# Patient Record
Sex: MALE | Race: BLACK OR AFRICAN AMERICAN | NOT HISPANIC OR LATINO | Employment: FULL TIME | URBAN - METROPOLITAN AREA
[De-identification: names, ages, dates, MRNs, and addresses within clinical notes are randomized per-mention and may not be internally consistent; named-entity substitution may affect disease eponyms.]

---

## 2018-01-24 PROBLEM — R00.1 SLOW HEART BEAT: Status: ACTIVE | Noted: 2018-01-24

## 2018-01-24 PROBLEM — R94.31 ABNORMAL EKG: Status: ACTIVE | Noted: 2018-01-24

## 2018-01-24 PROBLEM — I10 HYPERTENSION: Status: ACTIVE | Noted: 2018-01-24

## 2018-01-25 PROBLEM — R00.2 PALPITATIONS: Status: ACTIVE | Noted: 2018-01-25

## 2018-06-11 ENCOUNTER — NURSE TRIAGE (OUTPATIENT)
Dept: ADMINISTRATIVE | Facility: CLINIC | Age: 38
End: 2018-06-11
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
## 2020-03-29 ENCOUNTER — HOSPITAL ENCOUNTER (INPATIENT)
Facility: HOSPITAL | Age: 40
LOS: 4 days | Discharge: HOME | DRG: 177 | End: 2020-04-02
Attending: EMERGENCY MEDICINE | Admitting: FAMILY MEDICINE
Payer: MEDICAID

## 2020-03-29 DIAGNOSIS — U07.1 COVID-19 VIRUS INFECTION: ICD-10-CM

## 2020-03-29 DIAGNOSIS — R06.02 SHORTNESS OF BREATH: ICD-10-CM

## 2020-03-29 DIAGNOSIS — E11.9 TYPE 2 DIABETES MELLITUS WITHOUT COMPLICATION, WITHOUT LONG-TERM CURRENT USE OF INSULIN: ICD-10-CM

## 2020-03-29 DIAGNOSIS — R09.02 HYPOXIA: ICD-10-CM

## 2020-03-29 DIAGNOSIS — J81.0 ACUTE PULMONARY EDEMA: ICD-10-CM

## 2020-03-29 DIAGNOSIS — J18.9 PNEUMONIA OF BOTH LOWER LOBES DUE TO INFECTIOUS ORGANISM: Primary | ICD-10-CM

## 2020-03-29 DIAGNOSIS — Z20.822 SUSPECTED COVID-19 VIRUS INFECTION: ICD-10-CM

## 2020-03-29 LAB
ALBUMIN SERPL BCP-MCNC: 4 G/DL (ref 3.5–5.2)
ALP SERPL-CCNC: 60 U/L (ref 38–126)
ALT SERPL W/O P-5'-P-CCNC: 114 U/L (ref 10–44)
ANION GAP SERPL CALC-SCNC: 9 MMOL/L (ref 8–16)
AST SERPL-CCNC: 162 U/L (ref 15–46)
BASOPHILS # BLD AUTO: 0.01 K/UL (ref 0–0.2)
BASOPHILS NFR BLD: 0.1 % (ref 0–1.9)
BILIRUB SERPL-MCNC: 0.6 MG/DL (ref 0.1–1)
BNP SERPL-MCNC: <10 PG/ML (ref 0–99)
BUN SERPL-MCNC: 13 MG/DL (ref 2–20)
CALCIUM SERPL-MCNC: 8.8 MG/DL (ref 8.7–10.5)
CHLORIDE SERPL-SCNC: 97 MMOL/L (ref 95–110)
CK MB SERPL-MCNC: 2.2 NG/ML (ref 0.1–6.5)
CK MB SERPL-RTO: 0.1 % (ref 0–5)
CK SERPL-CCNC: 3697 U/L (ref 20–200)
CK SERPL-CCNC: 3774 U/L (ref 20–200)
CO2 SERPL-SCNC: 24 MMOL/L (ref 23–29)
CREAT SERPL-MCNC: 0.85 MG/DL (ref 0.5–1.4)
CRP SERPL-MCNC: 114.05 MG/L (ref 0–3.19)
CRP SERPL-MCNC: 8.95 MG/DL (ref 0–1)
D DIMER PPP IA.FEU-MCNC: 0.54 MG/L FEU
DIFFERENTIAL METHOD: ABNORMAL
EOSINOPHIL # BLD AUTO: 0 K/UL (ref 0–0.5)
EOSINOPHIL NFR BLD: 0 % (ref 0–8)
ERYTHROCYTE [DISTWIDTH] IN BLOOD BY AUTOMATED COUNT: 13.8 % (ref 11.5–14.5)
EST. GFR  (AFRICAN AMERICAN): >60 ML/MIN/1.73 M^2
EST. GFR  (NON AFRICAN AMERICAN): >60 ML/MIN/1.73 M^2
FERRITIN SERPL-MCNC: 1162 NG/ML (ref 20–300)
GLUCOSE SERPL-MCNC: 141 MG/DL (ref 70–110)
HCT VFR BLD AUTO: 39.7 % (ref 40–54)
HGB BLD-MCNC: 12.7 G/DL (ref 14–18)
IMM GRANULOCYTES # BLD AUTO: 0.08 K/UL (ref 0–0.04)
IMM GRANULOCYTES NFR BLD AUTO: 0.9 % (ref 0–0.5)
INFLUENZA A, MOLECULAR: NEGATIVE
INFLUENZA B, MOLECULAR: NEGATIVE
LDH SERPL L TO P-CCNC: 700 U/L (ref 110–260)
LYMPHOCYTES # BLD AUTO: 0.9 K/UL (ref 1–4.8)
LYMPHOCYTES NFR BLD: 10.5 % (ref 18–48)
MCH RBC QN AUTO: 23.1 PG (ref 27–31)
MCHC RBC AUTO-ENTMCNC: 32 G/DL (ref 32–36)
MCV RBC AUTO: 72 FL (ref 82–98)
MONOCYTES # BLD AUTO: 0.4 K/UL (ref 0.3–1)
MONOCYTES NFR BLD: 4.2 % (ref 4–15)
NEUTROPHILS # BLD AUTO: 7.4 K/UL (ref 1.8–7.7)
NEUTROPHILS NFR BLD: 84.3 % (ref 38–73)
NRBC BLD-RTO: 0 /100 WBC
PLATELET # BLD AUTO: 227 K/UL (ref 150–350)
PMV BLD AUTO: 11.6 FL (ref 9.2–12.9)
POTASSIUM SERPL-SCNC: 4.1 MMOL/L (ref 3.5–5.1)
PROT SERPL-MCNC: 7.7 G/DL (ref 6–8.4)
RBC # BLD AUTO: 5.49 M/UL (ref 4.6–6.2)
SODIUM SERPL-SCNC: 130 MMOL/L (ref 136–145)
SPECIMEN SOURCE: NORMAL
TROPONIN I SERPL DL<=0.01 NG/ML-MCNC: 0.02 NG/ML (ref 0–0.03)
WBC # BLD AUTO: 8.75 K/UL (ref 3.9–12.7)

## 2020-03-29 PROCEDURE — 86140 C-REACTIVE PROTEIN: CPT | Mod: ER

## 2020-03-29 PROCEDURE — 82550 ASSAY OF CK (CPK): CPT | Mod: 91

## 2020-03-29 PROCEDURE — 99285 EMERGENCY DEPT VISIT HI MDM: CPT | Mod: 25,ER

## 2020-03-29 PROCEDURE — 87502 INFLUENZA DNA AMP PROBE: CPT | Mod: ER

## 2020-03-29 PROCEDURE — 93010 ELECTROCARDIOGRAM REPORT: CPT | Mod: ,,, | Performed by: INTERNAL MEDICINE

## 2020-03-29 PROCEDURE — 82553 CREATINE MB FRACTION: CPT

## 2020-03-29 PROCEDURE — G0378 HOSPITAL OBSERVATION PER HR: HCPCS

## 2020-03-29 PROCEDURE — 11000001 HC ACUTE MED/SURG PRIVATE ROOM

## 2020-03-29 PROCEDURE — 84484 ASSAY OF TROPONIN QUANT: CPT

## 2020-03-29 PROCEDURE — 36415 COLL VENOUS BLD VENIPUNCTURE: CPT

## 2020-03-29 PROCEDURE — 87040 BLOOD CULTURE FOR BACTERIA: CPT | Mod: ER

## 2020-03-29 PROCEDURE — 96365 THER/PROPH/DIAG IV INF INIT: CPT | Mod: ER

## 2020-03-29 PROCEDURE — 85025 COMPLETE CBC W/AUTO DIFF WBC: CPT | Mod: ER

## 2020-03-29 PROCEDURE — 96368 THER/DIAG CONCURRENT INF: CPT | Mod: ER

## 2020-03-29 PROCEDURE — 94760 N-INVAS EAR/PLS OXIMETRY 1: CPT | Mod: ER

## 2020-03-29 PROCEDURE — 82728 ASSAY OF FERRITIN: CPT

## 2020-03-29 PROCEDURE — U0002 COVID-19 LAB TEST NON-CDC: HCPCS | Mod: ER

## 2020-03-29 PROCEDURE — 63600175 PHARM REV CODE 636 W HCPCS: Mod: ER | Performed by: EMERGENCY MEDICINE

## 2020-03-29 PROCEDURE — 25000003 PHARM REV CODE 250: Performed by: STUDENT IN AN ORGANIZED HEALTH CARE EDUCATION/TRAINING PROGRAM

## 2020-03-29 PROCEDURE — 86141 C-REACTIVE PROTEIN HS: CPT

## 2020-03-29 PROCEDURE — 83880 ASSAY OF NATRIURETIC PEPTIDE: CPT

## 2020-03-29 PROCEDURE — 83615 LACTATE (LD) (LDH) ENZYME: CPT

## 2020-03-29 PROCEDURE — 93005 ELECTROCARDIOGRAM TRACING: CPT | Mod: ER

## 2020-03-29 PROCEDURE — 80053 COMPREHEN METABOLIC PANEL: CPT | Mod: ER

## 2020-03-29 PROCEDURE — 96372 THER/PROPH/DIAG INJ SC/IM: CPT

## 2020-03-29 PROCEDURE — 82550 ASSAY OF CK (CPK): CPT

## 2020-03-29 PROCEDURE — 86703 HIV-1/HIV-2 1 RESULT ANTBDY: CPT

## 2020-03-29 PROCEDURE — 85379 FIBRIN DEGRADATION QUANT: CPT

## 2020-03-29 PROCEDURE — 27000221 HC OXYGEN, UP TO 24 HOURS: Mod: ER

## 2020-03-29 PROCEDURE — 82955 ASSAY OF G6PD ENZYME: CPT

## 2020-03-29 PROCEDURE — 63600175 PHARM REV CODE 636 W HCPCS: Performed by: STUDENT IN AN ORGANIZED HEALTH CARE EDUCATION/TRAINING PROGRAM

## 2020-03-29 PROCEDURE — 93010 EKG 12-LEAD: ICD-10-PCS | Mod: ,,, | Performed by: INTERNAL MEDICINE

## 2020-03-29 PROCEDURE — 99900035 HC TECH TIME PER 15 MIN (STAT)

## 2020-03-29 RX ORDER — BENZONATATE 100 MG/1
100 CAPSULE ORAL 3 TIMES DAILY PRN
Status: DISCONTINUED | OUTPATIENT
Start: 2020-03-29 | End: 2020-04-02 | Stop reason: HOSPADM

## 2020-03-29 RX ORDER — ACETAMINOPHEN 325 MG/1
650 TABLET ORAL EVERY 6 HOURS PRN
Status: DISCONTINUED | OUTPATIENT
Start: 2020-03-29 | End: 2020-04-02 | Stop reason: HOSPADM

## 2020-03-29 RX ORDER — SODIUM CHLORIDE 0.9 % (FLUSH) 0.9 %
10 SYRINGE (ML) INJECTION
Status: DISCONTINUED | OUTPATIENT
Start: 2020-03-29 | End: 2020-04-02 | Stop reason: HOSPADM

## 2020-03-29 RX ORDER — AZITHROMYCIN 250 MG/1
500 TABLET, FILM COATED ORAL DAILY
Status: DISCONTINUED | OUTPATIENT
Start: 2020-03-30 | End: 2020-04-02 | Stop reason: HOSPADM

## 2020-03-29 RX ORDER — ONDANSETRON 2 MG/ML
4 INJECTION INTRAMUSCULAR; INTRAVENOUS EVERY 8 HOURS PRN
Status: DISCONTINUED | OUTPATIENT
Start: 2020-03-29 | End: 2020-04-02 | Stop reason: HOSPADM

## 2020-03-29 RX ORDER — SODIUM CHLORIDE 9 MG/ML
INJECTION, SOLUTION INTRAVENOUS CONTINUOUS
Status: DISCONTINUED | OUTPATIENT
Start: 2020-03-29 | End: 2020-03-29

## 2020-03-29 RX ORDER — ENOXAPARIN SODIUM 100 MG/ML
40 INJECTION SUBCUTANEOUS EVERY 24 HOURS
Status: DISCONTINUED | OUTPATIENT
Start: 2020-03-29 | End: 2020-04-02 | Stop reason: HOSPADM

## 2020-03-29 RX ADMIN — ENOXAPARIN SODIUM 40 MG: 100 INJECTION SUBCUTANEOUS at 09:03

## 2020-03-29 RX ADMIN — CEFTRIAXONE 1 G: 1 INJECTION, SOLUTION INTRAVENOUS at 10:03

## 2020-03-29 RX ADMIN — AZITHROMYCIN MONOHYDRATE 500 MG: 500 INJECTION, POWDER, LYOPHILIZED, FOR SOLUTION INTRAVENOUS at 10:03

## 2020-03-29 RX ADMIN — BENZONATATE 100 MG: 100 CAPSULE ORAL at 03:03
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
## 2020-03-29 NOTE — PROGRESS NOTES
Due to a critical shortage of azithromycin IV, IV azithromycin order converted to oral azithromycin per P&T approved protocol.

## 2020-03-29 NOTE — ED NOTES
[id number] [place] Arrival , report and paper work provided, patient to [facility] without any difficulty

## 2020-03-29 NOTE — H&P
History & Physical  [facility]      SUBJECTIVE:     History of Present Illness:  Patient is a 40 y.o. male with pmhx HTN, who presents who presents with cough, shortness of breath and fever x1 week.  Patient denies any other symptoms similar to this in the past.  Patient is not immunocompromised, no chronic lung disease or history of malignancy.  Patient states that his shortness of breath is exacerbated by exertion and also worse at night.  Nothing relieves the patient's symptoms.  In the ED, temp 101°, blood pressure 160/87, pulse 105, respiratory rate 24, oxygen saturation 91% on room air.  Patient started on ceftriaxone azithromycin.  Blood cultures obtained.  Influenza negative.  Family medicine then consulted for admission.    Review of patient's allergies indicates:   Allergen Reactions    Iodine and iodide containing products Anaphylaxis       Past Medical History:   Diagnosis Date    Hypertension     Metatarsal fracture     10/14; left     History reviewed. No pertinent surgical history.  Family History   Problem Relation Age of Onset    Diabetes Mother     Cancer Mother     No Known Problems Father      Social History     Tobacco Use    Smoking status: Never Smoker    Smokeless tobacco: Never Used   Substance Use Topics    Alcohol use: Yes     Alcohol/week: 0.0 standard drinks     Comment: occ    Drug use: No        Review of Systems   Constitutional: Negative for chills and fever.   HENT: Negative for sore throat.    Eyes: Negative for blurred vision and double vision.   Respiratory: Negative for cough and shortness of breath.    Cardiovascular: Negative for chest pain.   Gastrointestinal: Negative for abdominal pain, constipation, diarrhea, heartburn, nausea and vomiting.   Genitourinary: Negative for dysuria and urgency.   Musculoskeletal: Negative for back pain and falls.   Skin: Negative for itching and rash.   Neurological: Negative for headaches.   Endo/Heme/Allergies: Does not  bruise/bleed easily.   Psychiatric/Behavioral: The patient is not nervous/anxious.        OBJECTIVE:     Vital Signs (Most Recent)  Temp: (!) 100.4 °F (38 °C) (03/29/20 1258)  Pulse: 99 (03/29/20 1258)  Resp: (!) 28 (03/29/20 1258)  BP: (!) 142/96 (03/29/20 1258)  SpO2: (!) 91 % (03/29/20 1258)    Physical Exam   Constitutional: He is oriented to person, place, and time. He appears well-developed and well-nourished.   Exam performed via video rounds.    Pulmonary/Chest:   Observed equal bilateral symmetrical chest rise with unlabored breathing and no notable tripod positioning.    Abdominal: He exhibits no distension.   Musculoskeletal: Normal range of motion.   Neurological: He is alert and oriented to person, place, and time.   Psychiatric: He has a normal mood and affect.     Laboratory    LABS  CBC  Recent Labs   Lab 03/29/20  0750   WBC 8.75   RBC 5.49   HGB 12.7*   HCT 39.7*      MCV 72*   MCH 23.1*   MCHC 32.0     BMP  Recent Labs   Lab 03/29/20  0750   *   K 4.1   CO2 24   CL 97   BUN 13   CREATININE 0.85       Recent Labs   Lab 03/29/20  0750   CALCIUM 8.8     LFT  Recent Labs   Lab 03/29/20  0750   PROT 7.7   ALBUMIN 4.0   BILITOT 0.6   *   ALKPHOS 60   *     LAST HbA1c  Lab Results   Component Value Date    HGBA1C 5.6 02/10/2015         Diagnostic Results:  Imaging Results          X-Ray Chest AP Portable (Final result)  Result time 03/29/20 08:09:27    Final result by Davie Koch MD (03/29/20 08:09:27)                 Impression:      Patchy bilateral ground-glass opacification suspicious for pneumonia versus viral pneumonitis. Component of pulmonary edema also likely present. No pneumothorax or pleural effusion. Heart normal size.      Electronically signed by: Davie Koch  Date:    03/29/2020  Time:    08:09             Narrative:    EXAMINATION:  XR CHEST AP PORTABLE    CLINICAL HISTORY:  . Shortness of breath    TECHNIQUE:  Single frontal portable view of the chest was  performed.    COMPARISON:  None    FINDINGS:  Support devices: None    Patchy bilateral ground-glass opacification suspicious for pneumonia versus viral pneumonitis.  Component of pulmonary edema also likely present.  No pneumothorax or pleural effusion.  Heart normal size.    Bones are intact.                                ASSESSMENT/PLAN:   40 y.o.male has a past medical history of Hypertension, who presents to [facility] ED c/o of cough, SOB and fever x 1 week.     Community Acquired Pneumonia 2/2 Suspected COVID-19   Lymph# - 0.9   CXR - shows patchy bilateral ground-glass opacification suspicious for pneumonia versus viral pneumonitis.   Influenza negative   Follow up Blood Cultures x 2   COVID-19 pending   Follow up Procal   Follow up Myocarditis labs   Start Ceftriaxone and Azithromycin     Essential HTN   Bp currently stable   Home regimen: Amlodipine 10mg and Lisinopril-HCTZ 20-25mg PO daily   Will continue to monitor     Code: Full  Diet: NPO  Dispo: Admit to floor. Continue current treatment for CAP. Follow up COVID-19. Monitor vitals.     Nhan Woods MD   [employer] [facility], PGY-2

## 2020-03-29 NOTE — PLAN OF CARE
VN cued into room for q2h rounding.  Pt resting comfortably in bed. Pt complaining of headache and temp of 100.4.  Placed call to doctor to get order for tylenol.   Call light within reach, fall precautions maintained.  VN will continue to follow and be available as needed.

## 2020-03-29 NOTE — PLAN OF CARE
Patient arrived via EMS from [facility] for SOB/cough/fever x1 week. Rule out Covid. Low grade fever of 100.4 Hold tylenol to rule out true fever.  Patient A&Ox4  2L of oxygen on arrival. Currently on 8L highflo oxygen ranging from 88-92%. Continuous pulse ox.   No tele orders.  Regular diet.  Urinal at bedside. Instructed patient to stay in bed due to desatting with movement.  Skin intact  No complaints of pain.  Will continue to monitor.    Nydia Otoole RN

## 2020-03-29 NOTE — PLAN OF CARE
VN cued into room to complete admit assessment, VIP model introduced, VN working alongside bedside treatment team.  Plan of care reviewed with patient. Patient verbalized understanding. Patient informed of fall risk and fall precautions, call light within reach, 2x bed rails. Patient notified to ask staff for assistance and pt verbalized complete understanding. Time allowed for questions. Will continue to monitor and intervene as needed.

## 2020-03-30 LAB
ALBUMIN SERPL BCP-MCNC: 2.9 G/DL (ref 3.5–5.2)
ALP SERPL-CCNC: 60 U/L (ref 55–135)
ALT SERPL W/O P-5'-P-CCNC: 114 U/L (ref 10–44)
ANION GAP SERPL CALC-SCNC: 14 MMOL/L (ref 8–16)
AST SERPL-CCNC: 116 U/L (ref 10–40)
BASOPHILS # BLD AUTO: 0.02 K/UL (ref 0–0.2)
BASOPHILS NFR BLD: 0.2 % (ref 0–1.9)
BILIRUB SERPL-MCNC: 0.6 MG/DL (ref 0.1–1)
BUN SERPL-MCNC: 11 MG/DL (ref 6–20)
CALCIUM SERPL-MCNC: 9.3 MG/DL (ref 8.7–10.5)
CHLORIDE SERPL-SCNC: 93 MMOL/L (ref 95–110)
CK MB SERPL-MCNC: 2.2 NG/ML (ref 0.1–6.5)
CK MB SERPL-RTO: 0.1 % (ref 0–5)
CK SERPL-CCNC: 3049 U/L (ref 20–200)
CO2 SERPL-SCNC: 22 MMOL/L (ref 23–29)
CREAT SERPL-MCNC: 0.9 MG/DL (ref 0.5–1.4)
CRP SERPL-MCNC: 132.5 MG/L (ref 0–3.19)
D DIMER PPP IA.FEU-MCNC: 1 MG/L FEU
DIFFERENTIAL METHOD: ABNORMAL
EOSINOPHIL # BLD AUTO: 0 K/UL (ref 0–0.5)
EOSINOPHIL NFR BLD: 0.1 % (ref 0–8)
ERYTHROCYTE [DISTWIDTH] IN BLOOD BY AUTOMATED COUNT: 13.7 % (ref 11.5–14.5)
EST. GFR  (AFRICAN AMERICAN): >60 ML/MIN/1.73 M^2
EST. GFR  (NON AFRICAN AMERICAN): >60 ML/MIN/1.73 M^2
FERRITIN SERPL-MCNC: 1259 NG/ML (ref 20–300)
GLUCOSE SERPL-MCNC: 115 MG/DL (ref 70–110)
HCT VFR BLD AUTO: 39.1 % (ref 40–54)
HGB BLD-MCNC: 12.6 G/DL (ref 14–18)
HIV 1+2 AB+HIV1 P24 AG SERPL QL IA: NEGATIVE
IMM GRANULOCYTES # BLD AUTO: 0.16 K/UL (ref 0–0.04)
IMM GRANULOCYTES NFR BLD AUTO: 1.5 % (ref 0–0.5)
LDH SERPL L TO P-CCNC: 682 U/L (ref 110–260)
LYMPHOCYTES # BLD AUTO: 0.9 K/UL (ref 1–4.8)
LYMPHOCYTES NFR BLD: 8.7 % (ref 18–48)
MAGNESIUM SERPL-MCNC: 2.3 MG/DL (ref 1.6–2.6)
MCH RBC QN AUTO: 23.1 PG (ref 27–31)
MCHC RBC AUTO-ENTMCNC: 32.2 G/DL (ref 32–36)
MCV RBC AUTO: 72 FL (ref 82–98)
MONOCYTES # BLD AUTO: 0.5 K/UL (ref 0.3–1)
MONOCYTES NFR BLD: 4.4 % (ref 4–15)
NEUTROPHILS # BLD AUTO: 8.9 K/UL (ref 1.8–7.7)
NEUTROPHILS NFR BLD: 85.1 % (ref 38–73)
NRBC BLD-RTO: 0 /100 WBC
PHOSPHATE SERPL-MCNC: 3.1 MG/DL (ref 2.7–4.5)
PLATELET # BLD AUTO: 321 K/UL (ref 150–350)
PMV BLD AUTO: 12.1 FL (ref 9.2–12.9)
POTASSIUM SERPL-SCNC: 4.2 MMOL/L (ref 3.5–5.1)
PROCALCITONIN SERPL IA-MCNC: 0.21 NG/ML
PROT SERPL-MCNC: 7.7 G/DL (ref 6–8.4)
RBC # BLD AUTO: 5.45 M/UL (ref 4.6–6.2)
SARS-COV-2 RNA RESP QL NAA+PROBE: DETECTED
SODIUM SERPL-SCNC: 129 MMOL/L (ref 136–145)
TROPONIN I SERPL DL<=0.01 NG/ML-MCNC: 0.01 NG/ML (ref 0–0.03)
WBC # BLD AUTO: 10.46 K/UL (ref 3.9–12.7)

## 2020-03-30 PROCEDURE — 80053 COMPREHEN METABOLIC PANEL: CPT

## 2020-03-30 PROCEDURE — 82553 CREATINE MB FRACTION: CPT

## 2020-03-30 PROCEDURE — 36415 COLL VENOUS BLD VENIPUNCTURE: CPT

## 2020-03-30 PROCEDURE — 11000001 HC ACUTE MED/SURG PRIVATE ROOM

## 2020-03-30 PROCEDURE — 82550 ASSAY OF CK (CPK): CPT

## 2020-03-30 PROCEDURE — 83735 ASSAY OF MAGNESIUM: CPT

## 2020-03-30 PROCEDURE — 63600175 PHARM REV CODE 636 W HCPCS: Performed by: STUDENT IN AN ORGANIZED HEALTH CARE EDUCATION/TRAINING PROGRAM

## 2020-03-30 PROCEDURE — 83615 LACTATE (LD) (LDH) ENZYME: CPT

## 2020-03-30 PROCEDURE — 25000003 PHARM REV CODE 250: Performed by: STUDENT IN AN ORGANIZED HEALTH CARE EDUCATION/TRAINING PROGRAM

## 2020-03-30 PROCEDURE — 84484 ASSAY OF TROPONIN QUANT: CPT

## 2020-03-30 PROCEDURE — 25000003 PHARM REV CODE 250: Performed by: FAMILY MEDICINE

## 2020-03-30 PROCEDURE — 84100 ASSAY OF PHOSPHORUS: CPT

## 2020-03-30 PROCEDURE — 94761 N-INVAS EAR/PLS OXIMETRY MLT: CPT

## 2020-03-30 PROCEDURE — 85379 FIBRIN DEGRADATION QUANT: CPT

## 2020-03-30 PROCEDURE — 86141 C-REACTIVE PROTEIN HS: CPT

## 2020-03-30 PROCEDURE — 82728 ASSAY OF FERRITIN: CPT

## 2020-03-30 PROCEDURE — 96376 TX/PRO/DX INJ SAME DRUG ADON: CPT

## 2020-03-30 PROCEDURE — 84145 PROCALCITONIN (PCT): CPT

## 2020-03-30 PROCEDURE — 85025 COMPLETE CBC W/AUTO DIFF WBC: CPT

## 2020-03-30 PROCEDURE — 27000221 HC OXYGEN, UP TO 24 HOURS

## 2020-03-30 RX ORDER — METHYLPREDNISOLONE SOD SUCC 125 MG
35 VIAL (EA) INJECTION EVERY 6 HOURS
Status: DISCONTINUED | OUTPATIENT
Start: 2020-03-30 | End: 2020-03-30

## 2020-03-30 RX ORDER — HYDROXYCHLOROQUINE SULFATE 200 MG/1
400 TABLET, FILM COATED ORAL 2 TIMES DAILY
Status: COMPLETED | OUTPATIENT
Start: 2020-03-30 | End: 2020-03-31

## 2020-03-30 RX ORDER — HYDROXYCHLOROQUINE SULFATE 200 MG/1
400 TABLET, FILM COATED ORAL DAILY
Status: DISCONTINUED | OUTPATIENT
Start: 2020-04-01 | End: 2020-04-02 | Stop reason: HOSPADM

## 2020-03-30 RX ORDER — METHYLPREDNISOLONE SOD SUCC 125 MG
30 VIAL (EA) INJECTION EVERY 6 HOURS
Status: DISCONTINUED | OUTPATIENT
Start: 2020-03-30 | End: 2020-03-30

## 2020-03-30 RX ADMIN — ENOXAPARIN SODIUM 40 MG: 100 INJECTION SUBCUTANEOUS at 09:03

## 2020-03-30 RX ADMIN — METHYLPREDNISOLONE SODIUM SUCCINATE 30 MG: 40 INJECTION, POWDER, FOR SOLUTION INTRAMUSCULAR; INTRAVENOUS at 05:03

## 2020-03-30 RX ADMIN — METHYLPREDNISOLONE SODIUM SUCCINATE 30 MG: 40 INJECTION, POWDER, FOR SOLUTION INTRAMUSCULAR; INTRAVENOUS at 11:03

## 2020-03-30 RX ADMIN — AZITHROMYCIN MONOHYDRATE 500 MG: 250 TABLET ORAL at 09:03

## 2020-03-30 RX ADMIN — CEFTRIAXONE 1 G: 1 INJECTION, SOLUTION INTRAVENOUS at 09:03

## 2020-03-30 RX ADMIN — HYDROXYCHLOROQUINE SULFATE 400 MG: 200 TABLET, FILM COATED ORAL at 09:03

## 2020-03-30 NOTE — PROGRESS NOTES
Progress Note  [facility]  Admit Date: 3/29/2020   LOS: 0 days   SUBJECTIVE:     No acute overnight events.  Last fever 100.4 at 16 20 a on 03/29.  Good urinary output.  Passing flatus.  Remains on treatment for community-acquired pneumonia.    Review of Systems   Constitutional: Negative for chills and fever.   HENT: Negative for sore throat.    Eyes: Negative for blurred vision and double vision.   Respiratory: Negative for cough and shortness of breath.    Cardiovascular: Negative for chest pain.   Gastrointestinal: Negative for abdominal pain, constipation, diarrhea, heartburn, nausea and vomiting.   Genitourinary: Negative for dysuria and urgency.   Musculoskeletal: Negative for back pain and falls.   Skin: Negative for itching and rash.   Neurological: Negative for headaches.   Endo/Heme/Allergies: Does not bruise/bleed easily.   Psychiatric/Behavioral: The patient is not nervous/anxious.        OBJECTIVE:   Vital Signs (Most Recent)  Temp: 99.3 °F (37.4 °C) (03/30/20 0358)  Pulse: 99 (03/30/20 0427)  Resp: 17 (03/30/20 0358)  BP: 136/83 (03/30/20 0358)  SpO2: 97 % (03/30/20 0427)    I & O (Last 24H):    Intake/Output Summary (Last 24 hours) at 3/30/2020 0641  Last data filed at 3/30/2020 0500  Gross per 24 hour   Intake 1160 ml   Output 1300 ml   Net -140 ml     Wt Readings from Last 3 Encounters:   03/30/20 (!) 139.4 kg (307 lb 5.1 oz)   01/25/18 134.2 kg (295 lb 13.7 oz)   02/10/15 121.5 kg (267 lb 12.8 oz)       Current Diet Order   Procedures    Diet Cardiac        Physical Exam   Constitutional: He is oriented to person, place, and time and well-developed, well-nourished, and in no distress.   HENT:   Head: Normocephalic and atraumatic.   Pulmonary/Chest:   Observed symmetrical bilateral chest rise with unlabored breathing.   Abdominal: He exhibits no distension.   Musculoskeletal: Normal range of motion.   Neurological: He is alert and oriented to person, place, and time.       Laboratory  Data:  CBC  Recent Labs   Lab 03/29/20  0750   WBC 8.75   RBC 5.49   HGB 12.7*   HCT 39.7*      MCV 72*   MCH 23.1*   MCHC 32.0     CMP  Recent Labs   Lab 03/29/20  0750   CALCIUM 8.8   PROT 7.7   *   K 4.1   CO2 24   CL 97   BUN 13   CREATININE 0.85   ALKPHOS 60   *   *   BILITOT 0.6     POCT-Glucose  No results found for: POCTGLUCOSE  COAGS  No results for input(s): PT, INR, APTT in the last 168 hours.  UA  No results for input(s): COLORU, CLARITYU, SPECGRAV, PHUR, PROTEINUA, GLUCOSEU, BLOODU, WBCU, RBCU, BACTERIA, MUCUS in the last 24 hours.    Invalid input(s):  BILIRUBINCON  MICRO  Microbiology Results (last 7 days)     Procedure Component Value Units Date/Time    Blood culture [[id number]] Collected:  03/29/20 1040    Order Status:  Sent Specimen:  Blood from Peripheral, Foot, Left Updated:  03/29/20 2055    Blood culture [[id number]] Collected:  03/29/20 1055    Order Status:  Sent Specimen:  Blood from Peripheral, Hand, Left Updated:  03/29/20 2055    Influenza A & B by Molecular [[id number]] Collected:  03/29/20 0750    Order Status:  Completed Specimen:  Nasopharyngeal Swab Updated:  03/29/20 0823     Influenza A, Molecular Negative     Influenza B, Molecular Negative     Flu A & B Source Nasal swab        LIPID PANEL  Lab Results   Component Value Date    CHOL 168 02/10/2015     Lab Results   Component Value Date    HDL 50 02/10/2015     Lab Results   Component Value Date    LDLCALC 101.6 02/10/2015     Lab Results   Component Value Date    TRIG 82 02/10/2015     Lab Results   Component Value Date    CHOLHDL 29.8 02/10/2015       Diagnostic Results:  Imaging Results          X-Ray Chest AP Portable (Final result)  Result time 03/29/20 08:09:27    Final result by Davie Koch MD (03/29/20 08:09:27)                 Impression:      Patchy bilateral ground-glass opacification suspicious for pneumonia versus viral pneumonitis. Component of pulmonary edema also likely present. No  pneumothorax or pleural effusion. Heart normal size.      Electronically signed by: Davie Koch  Date:    03/29/2020  Time:    08:09             Narrative:    EXAMINATION:  XR CHEST AP PORTABLE    CLINICAL HISTORY:  . Shortness of breath    TECHNIQUE:  Single frontal portable view of the chest was performed.    COMPARISON:  None    FINDINGS:  Support devices: None    Patchy bilateral ground-glass opacification suspicious for pneumonia versus viral pneumonitis.  Component of pulmonary edema also likely present.  No pneumothorax or pleural effusion.  Heart normal size.    Bones are intact.                                ASSESSMENT/PLAN:   40 y.o.male has a past medical history of Hypertension, who presents to [facility] ED c/o of cough, SOB and fever x 1 week.      Community Acquired Pneumonia 2/2 Suspected COVID-19   Lymph# - 0.9   CXR - shows patchy bilateral ground-glass opacification suspicious for pneumonia versus viral pneumonitis.   Influenza negative   Follow up Blood Cultures x 2   COVID-19 pending   Follow up Procal   Follow up Myocarditis labs   Start Ceftriaxone and Azithromycin     3/30 - currently on ceftriaxone azithromycin day 2.  COVID -19 test currently pending.  Remains on high-flow nasal cannula 8 L.      Essential HTN   Bp currently stable   Home regimen: Amlodipine 10mg and Lisinopril-HCTZ 20-25mg PO daily   Will continue to monitor      Code: Full  Diet: NPO  Dispo: Continue current treatment for CAP. Follow up COVID-19. Monitor vitals.      Nhan Woods MD   [employer] [facility], PGY-2

## 2020-03-30 NOTE — NURSING
Dr. Willingham notified of patient c/o HA, current temp of 100, and request for tylenol; new order received.

## 2020-03-30 NOTE — NURSING
VIRTUAL NURSE 2 HOUR ROUNDS:  Cued into patient's room.  AAOX3.  Sitting up on side of bed.  No complaints.  Will cont to monitor.

## 2020-03-30 NOTE — PLAN OF CARE
VIRTUAL NURSE:  Cued into patient's room.  Permission received per patient to turn camera to view patient.  Introduced as VN for night shift that will be working with floor nurse and nursing assistant.  Educated patient on VN's role in patient care. Plan of care reviewed with patient. Education per flowsheet.  Opportunity given for questions and questions answered.  No complaints.  Instructed to call for assistance.  Will cont to monitor.    Labs, notes, and orders reviewed.

## 2020-03-30 NOTE — PLAN OF CARE
Patient remains on oxygen @ 8 liters high flow saturations 94%. No complaints of SOB overnight. Lovenox given for DVT prevention. Tolerating regular diet well. Remains free from falls, bed alarm in use. Bed remains in low position, call light in reach. Will continue to monitor.

## 2020-03-30 NOTE — NURSING
VIRTUAL NURSE 2 HOUR ROUNDS:  Cued into patient's room.  Patient resting comfortably in bed with watching TV; respirations even and unlabored.  No complaints; no distress noted.  Will cont to monitor.

## 2020-03-30 NOTE — PLAN OF CARE
VN cued into room for q2h rounding.  Pt sitting up in chair eating dinner, NC on satting 97%.  No needs or complaints at this time.  Call light within reach, fall precautions maintained.  VN will continue to follow and be available as needed.

## 2020-03-30 NOTE — PLAN OF CARE
TN spoke with pt via phone for d/c planning. Pt current Covid-19 r/o. Pt is self pay.Pt lives with wife Wanda. Pt independent with ADLs and is a . Pt states he is able to afford his medications on d/c. Pt's wife to provide transportation on d/c.        03/30/20 0951   Discharge Assessment   Assessment Type Discharge Planning Assessment   Confirmed/corrected address and phone number on facesheet? Yes   Assessment information obtained from? Patient   Expected Length of Stay (days) 2   Communicated expected length of stay with patient/caregiver yes   Prior to hospitilization cognitive status: Alert/Oriented   Prior to hospitalization functional status: Independent   Current cognitive status: Alert/Oriented   Current Functional Status: Independent   Lives With spouse   Able to Return to Prior Arrangements yes   Is patient able to care for self after discharge? Yes   Who are your caregiver(s) and their phone number(s)? Wanda Lemus Spouse   [phone]    Patient's perception of discharge disposition home or selfcare   Readmission Within the Last 30 Days no previous admission in last 30 days   Patient currently being followed by outpatient case management? No   Patient currently receives any other outside agency services? No   Equipment Currently Used at Home none   Do you have any problems affording any of your prescribed medications? No   Is the patient taking medications as prescribed? no   Does the patient have transportation home? Yes   Transportation Anticipated family or friend will provide   Does the patient receive services at the [facility]? No   Discharge Plan A Home;Home with family   Discharge Plan B Home;Home with family   DME Needed Upon Discharge  none   Patient/Family in Agreement with Plan yes

## 2020-03-30 NOTE — PLAN OF CARE
VN cued into room for q2h rounding.  Pt ambulating in room, staff in room assisting patient.  No needs or complaints at this time.  Call light within reach, fall precautions maintained.  VN will continue to follow and be available as needed.

## 2020-03-30 NOTE — PLAN OF CARE
VN cued into room for q2h rounding.  Pt ambulating in room, no SOB.  No needs or complaints at this time.  Call light within reach, fall precautions maintained.  VN will continue to follow and be available as needed.

## 2020-03-31 PROBLEM — U07.1 COVID-19 VIRUS INFECTION: Status: ACTIVE | Noted: 2020-03-31

## 2020-03-31 LAB
ALBUMIN SERPL BCP-MCNC: 2.9 G/DL (ref 3.5–5.2)
ALP SERPL-CCNC: 63 U/L (ref 55–135)
ALT SERPL W/O P-5'-P-CCNC: 120 U/L (ref 10–44)
ANION GAP SERPL CALC-SCNC: 15 MMOL/L (ref 8–16)
AST SERPL-CCNC: 77 U/L (ref 10–40)
BASOPHILS # BLD AUTO: 0.01 K/UL (ref 0–0.2)
BASOPHILS NFR BLD: 0.1 % (ref 0–1.9)
BILIRUB SERPL-MCNC: 0.5 MG/DL (ref 0.1–1)
BUN SERPL-MCNC: 16 MG/DL (ref 6–20)
CALCIUM SERPL-MCNC: 10 MG/DL (ref 8.7–10.5)
CHLORIDE SERPL-SCNC: 94 MMOL/L (ref 95–110)
CK MB SERPL-MCNC: 2.8 NG/ML (ref 0.1–6.5)
CK MB SERPL-RTO: 0.2 % (ref 0–5)
CK SERPL-CCNC: 1385 U/L (ref 20–200)
CO2 SERPL-SCNC: 21 MMOL/L (ref 23–29)
CREAT SERPL-MCNC: 0.9 MG/DL (ref 0.5–1.4)
CRP SERPL-MCNC: 132.42 MG/L (ref 0–3.19)
D DIMER PPP IA.FEU-MCNC: 1.05 MG/L FEU
DIFFERENTIAL METHOD: ABNORMAL
EOSINOPHIL # BLD AUTO: 0 K/UL (ref 0–0.5)
EOSINOPHIL NFR BLD: 0 % (ref 0–8)
ERYTHROCYTE [DISTWIDTH] IN BLOOD BY AUTOMATED COUNT: 13.4 % (ref 11.5–14.5)
EST. GFR  (AFRICAN AMERICAN): >60 ML/MIN/1.73 M^2
EST. GFR  (NON AFRICAN AMERICAN): >60 ML/MIN/1.73 M^2
FERRITIN SERPL-MCNC: 1652 NG/ML (ref 20–300)
GLUCOSE SERPL-MCNC: 173 MG/DL (ref 70–110)
HCT VFR BLD AUTO: 39.1 % (ref 40–54)
HGB BLD-MCNC: 12.6 G/DL (ref 14–18)
IMM GRANULOCYTES # BLD AUTO: 0.43 K/UL (ref 0–0.04)
IMM GRANULOCYTES NFR BLD AUTO: 4.5 % (ref 0–0.5)
LDH SERPL L TO P-CCNC: 571 U/L (ref 110–260)
LYMPHOCYTES # BLD AUTO: 0.8 K/UL (ref 1–4.8)
LYMPHOCYTES NFR BLD: 8.5 % (ref 18–48)
MAGNESIUM SERPL-MCNC: 2.6 MG/DL (ref 1.6–2.6)
MCH RBC QN AUTO: 23.2 PG (ref 27–31)
MCHC RBC AUTO-ENTMCNC: 32.2 G/DL (ref 32–36)
MCV RBC AUTO: 72 FL (ref 82–98)
MONOCYTES # BLD AUTO: 0.3 K/UL (ref 0.3–1)
MONOCYTES NFR BLD: 3 % (ref 4–15)
NEUTROPHILS # BLD AUTO: 8 K/UL (ref 1.8–7.7)
NEUTROPHILS NFR BLD: 83.9 % (ref 38–73)
NRBC BLD-RTO: 0 /100 WBC
PHOSPHATE SERPL-MCNC: 3.8 MG/DL (ref 2.7–4.5)
PLATELET # BLD AUTO: 353 K/UL (ref 150–350)
PMV BLD AUTO: 11 FL (ref 9.2–12.9)
POTASSIUM SERPL-SCNC: 4.3 MMOL/L (ref 3.5–5.1)
PROT SERPL-MCNC: 8.4 G/DL (ref 6–8.4)
RBC # BLD AUTO: 5.44 M/UL (ref 4.6–6.2)
SODIUM SERPL-SCNC: 130 MMOL/L (ref 136–145)
TROPONIN I SERPL DL<=0.01 NG/ML-MCNC: 0.01 NG/ML (ref 0–0.03)
WBC # BLD AUTO: 9.57 K/UL (ref 3.9–12.7)

## 2020-03-31 PROCEDURE — 80053 COMPREHEN METABOLIC PANEL: CPT

## 2020-03-31 PROCEDURE — 84100 ASSAY OF PHOSPHORUS: CPT

## 2020-03-31 PROCEDURE — 82553 CREATINE MB FRACTION: CPT

## 2020-03-31 PROCEDURE — 83615 LACTATE (LD) (LDH) ENZYME: CPT

## 2020-03-31 PROCEDURE — 82550 ASSAY OF CK (CPK): CPT

## 2020-03-31 PROCEDURE — 27000221 HC OXYGEN, UP TO 24 HOURS

## 2020-03-31 PROCEDURE — 83735 ASSAY OF MAGNESIUM: CPT

## 2020-03-31 PROCEDURE — 63600175 PHARM REV CODE 636 W HCPCS: Performed by: STUDENT IN AN ORGANIZED HEALTH CARE EDUCATION/TRAINING PROGRAM

## 2020-03-31 PROCEDURE — 82728 ASSAY OF FERRITIN: CPT

## 2020-03-31 PROCEDURE — 86141 C-REACTIVE PROTEIN HS: CPT

## 2020-03-31 PROCEDURE — 36415 COLL VENOUS BLD VENIPUNCTURE: CPT

## 2020-03-31 PROCEDURE — 85025 COMPLETE CBC W/AUTO DIFF WBC: CPT

## 2020-03-31 PROCEDURE — 25000003 PHARM REV CODE 250: Performed by: STUDENT IN AN ORGANIZED HEALTH CARE EDUCATION/TRAINING PROGRAM

## 2020-03-31 PROCEDURE — 11000001 HC ACUTE MED/SURG PRIVATE ROOM

## 2020-03-31 PROCEDURE — 63700000 PHARM REV CODE 250 ALT 637 W/O HCPCS: Performed by: FAMILY MEDICINE

## 2020-03-31 PROCEDURE — 94761 N-INVAS EAR/PLS OXIMETRY MLT: CPT

## 2020-03-31 PROCEDURE — 85379 FIBRIN DEGRADATION QUANT: CPT

## 2020-03-31 PROCEDURE — 84484 ASSAY OF TROPONIN QUANT: CPT

## 2020-03-31 RX ORDER — AMLODIPINE BESYLATE 5 MG/1
10 TABLET ORAL DAILY
Status: DISCONTINUED | OUTPATIENT
Start: 2020-03-31 | End: 2020-04-02 | Stop reason: HOSPADM

## 2020-03-31 RX ORDER — AMLODIPINE BESYLATE 5 MG/1
10 TABLET ORAL DAILY
Status: DISCONTINUED | OUTPATIENT
Start: 2020-04-01 | End: 2020-03-31

## 2020-03-31 RX ADMIN — ENOXAPARIN SODIUM 40 MG: 100 INJECTION SUBCUTANEOUS at 08:03

## 2020-03-31 RX ADMIN — METHYLPREDNISOLONE SODIUM SUCCINATE 30 MG: 40 INJECTION, POWDER, FOR SOLUTION INTRAMUSCULAR; INTRAVENOUS at 06:03

## 2020-03-31 RX ADMIN — AMLODIPINE BESYLATE 10 MG: 5 TABLET ORAL at 05:03

## 2020-03-31 RX ADMIN — METHYLPREDNISOLONE SODIUM SUCCINATE 30 MG: 40 INJECTION, POWDER, FOR SOLUTION INTRAMUSCULAR; INTRAVENOUS at 01:03

## 2020-03-31 RX ADMIN — HYDROXYCHLOROQUINE SULFATE 400 MG: 200 TABLET, FILM COATED ORAL at 09:03

## 2020-03-31 RX ADMIN — METHYLPREDNISOLONE SODIUM SUCCINATE 30 MG: 40 INJECTION, POWDER, FOR SOLUTION INTRAMUSCULAR; INTRAVENOUS at 05:03

## 2020-03-31 RX ADMIN — AZITHROMYCIN MONOHYDRATE 500 MG: 250 TABLET ORAL at 09:03

## 2020-03-31 RX ADMIN — METHYLPREDNISOLONE SODIUM SUCCINATE 30 MG: 40 INJECTION, POWDER, FOR SOLUTION INTRAMUSCULAR; INTRAVENOUS at 11:03

## 2020-03-31 RX ADMIN — METHYLPREDNISOLONE SODIUM SUCCINATE 30 MG: 40 INJECTION, POWDER, FOR SOLUTION INTRAMUSCULAR; INTRAVENOUS at 12:03

## 2020-03-31 NOTE — PLAN OF CARE
VIRTUAL NURSE 2 HOUR ROUNDS:  Cued into patient's room.  Patient resting comfortably in bed with eyes closed; respirations even and unlabored.  No distress noted.Cont. Pulse ox 90 %.  Will cont to monitor.

## 2020-03-31 NOTE — PLAN OF CARE
Problem: Adult Inpatient Plan of Care  Goal: Plan of Care Review  Outcome: Ongoing, Progressing  Mr. Lemus is resting and medications were given per orders. Cardiac and Cont Pulse Ox in place. 7Liter High Flow in place. VSS. No complaints of PAIN/NV/SOB. Cough noted. Safety maintained.

## 2020-03-31 NOTE — PLAN OF CARE
VN Q2 hour rounds: VN cued into pt's room. Pt denies any needs at this time. VN will continue to monitor.

## 2020-03-31 NOTE — PROGRESS NOTES
Progress Note  [facility]  Admit Date: 3/29/2020   LOS: 1 day   SUBJECTIVE:     No acute overnight events.  Last fever 100.4 at 0904  on 03/30.  Good urinary output.  Passing flatus.  Currently on azithromycin and hydroxychloroquine day 2.  Currently on methylprednisolone day 2.  Maintaining oxygen saturation on 8 L high-flow nasal cannula between 90-93% overnight.  No new complaints.    Review of Systems   Constitutional: Negative for chills and fever.   HENT: Negative for sore throat.    Eyes: Negative for blurred vision and double vision.   Respiratory: Negative for cough and shortness of breath.    Cardiovascular: Negative for chest pain.   Gastrointestinal: Negative for abdominal pain, constipation, diarrhea, heartburn, nausea and vomiting.   Genitourinary: Negative for dysuria and urgency.   Musculoskeletal: Negative for back pain and falls.   Skin: Negative for itching and rash.   Neurological: Negative for headaches.   Endo/Heme/Allergies: Does not bruise/bleed easily.   Psychiatric/Behavioral: The patient is not nervous/anxious.        OBJECTIVE:   Vital Signs (Most Recent)  Temp: 98.5 °F (36.9 °C) (03/31/20 0813)  Pulse: 83 (03/31/20 0813)  Resp: 20 (03/31/20 0813)  BP: (!) 166/77 (03/31/20 0813)  SpO2: 95 % (03/31/20 0813)    I & O (Last 24H):    Intake/Output Summary (Last 24 hours) at 3/31/2020 0831  Last data filed at 3/31/2020 0532  Gross per 24 hour   Intake 940 ml   Output 1100 ml   Net -160 ml     Wt Readings from Last 3 Encounters:   03/31/20 133.5 kg (294 lb 5 oz)   01/25/18 134.2 kg (295 lb 13.7 oz)   02/10/15 121.5 kg (267 lb 12.8 oz)       Current Diet Order   Procedures    Diet Cardiac        Physical Exam   Constitutional: He is oriented to person, place, and time and well-developed, well-nourished, and in no distress.   HENT:   Head: Normocephalic and atraumatic.   Pulmonary/Chest:   Observed symmetrical bilateral chest rise with unlabored breathing.   Abdominal: He exhibits no  distension.   Musculoskeletal: Normal range of motion.   Neurological: He is alert and oriented to person, place, and time.       Laboratory Data:  CBC  Recent Labs   Lab 03/29/20  0750 03/30/20  0737   WBC 8.75 10.46   RBC 5.49 5.45   HGB 12.7* 12.6*   HCT 39.7* 39.1*    321   MCV 72* 72*   MCH 23.1* 23.1*   MCHC 32.0 32.2     CMP  Recent Labs   Lab 03/29/20  0750 03/30/20  0737   CALCIUM 8.8 9.3   PROT 7.7 7.7   * 129*   K 4.1 4.2   CO2 24 22*   CL 97 93*   BUN 13 11   CREATININE 0.85 0.9   ALKPHOS 60 60   * 114*   * 116*   BILITOT 0.6 0.6     POCT-Glucose  No results found for: POCTGLUCOSE  COAGS  No results for input(s): PT, INR, APTT in the last 168 hours.  UA  No results for input(s): COLORU, CLARITYU, SPECGRAV, PHUR, PROTEINUA, GLUCOSEU, BLOODU, WBCU, RBCU, BACTERIA, MUCUS in the last 24 hours.    Invalid input(s):  BILIRUBINCON  MICRO  Microbiology Results (last 7 days)     Procedure Component Value Units Date/Time    Blood culture [[id number]] Collected:  03/29/20 1055    Order Status:  Completed Specimen:  Blood from Peripheral, Hand, Left Updated:  03/30/20 2212     Blood Culture, Routine No Growth to date      No Growth to date    Blood culture [[id number]] Collected:  03/29/20 1040    Order Status:  Completed Specimen:  Blood from Peripheral, Foot, Left Updated:  03/30/20 2212     Blood Culture, Routine No Growth to date      No Growth to date    Influenza A & B by Molecular [[id number]] Collected:  03/29/20 0750    Order Status:  Completed Specimen:  Nasopharyngeal Swab Updated:  03/29/20 0823     Influenza A, Molecular Negative     Influenza B, Molecular Negative     Flu A & B Source Nasal swab        LIPID PANEL  Lab Results   Component Value Date    CHOL 168 02/10/2015     Lab Results   Component Value Date    HDL 50 02/10/2015     Lab Results   Component Value Date    LDLCALC 101.6 02/10/2015     Lab Results   Component Value Date    TRIG 82 02/10/2015     Lab Results    Component Value Date    CHOLHDL 29.8 02/10/2015       Diagnostic Results:  Imaging Results          X-Ray Chest AP Portable (Final result)  Result time 03/29/20 08:09:27    Final result by Davie Koch MD (03/29/20 08:09:27)                 Impression:      Patchy bilateral ground-glass opacification suspicious for pneumonia versus viral pneumonitis. Component of pulmonary edema also likely present. No pneumothorax or pleural effusion. Heart normal size.      Electronically signed by: Davie Koch  Date:    03/29/2020  Time:    08:09             Narrative:    EXAMINATION:  XR CHEST AP PORTABLE    CLINICAL HISTORY:  . Shortness of breath    TECHNIQUE:  Single frontal portable view of the chest was performed.    COMPARISON:  None    FINDINGS:  Support devices: None    Patchy bilateral ground-glass opacification suspicious for pneumonia versus viral pneumonitis.  Component of pulmonary edema also likely present.  No pneumothorax or pleural effusion.  Heart normal size.    Bones are intact.                                ASSESSMENT/PLAN:   40 y.o.male has a past medical history of Hypertension, who presents to [facility] ED c/o of cough, SOB and fever x 1 week.      Viral PNA 2/2 to COVID-19   Lymph# - 0.9   CXR - shows patchy bilateral ground-glass opacification suspicious for pneumonia versus viral pneumonitis.   Influenza negative   Follow up Blood Cultures x 2   COVID-19 pending   Follow up [facility]cal   Follow up Myocarditis labs   Start Ceftriaxone and Azithromycin     3/30 - currently on ceftriaxone azithromycin day 2.  COVID -19 positive.  Remains on high-flow nasal cannula 8 L.   3/31 - Currently on azithromycin and hydroxychloroquine day 2.  Currently on methylprednisolone day 2.  Maintaining oxygen saturation on 8 L high-flow nasal cannula between 90-93%     Essential HTN   Bp 132/70 this AM  Home regimen: Amlodipine 10mg and Lisinopril-HCTZ 20-25mg PO daily   Will continue to monitor      Code: Full  Diet:  NPO  Dispo: Continue current treatment for COVID-19.  Monitor vitals.      Nhan Woods MD   [employer] [facility], PGY-2

## 2020-03-31 NOTE — PLAN OF CARE
VN Q 2 hour rounds: VN cued into patients room. Patient denies any needs at this time. VN will continue to monitor.

## 2020-03-31 NOTE — PLAN OF CARE
VN Note: VN cued into patient's room for Q2 hr rounds. Patient denies any needs at this time. VN will continue to follow.

## 2020-03-31 NOTE — PLAN OF CARE
VIRTUAL NURSE 2 HOUR ROUNDS:  Cued into patient's room.  Patient resting comfortably in bed with eyes closed; respirations even and unlabored. Cont. Pulse ox 93 %  No distress noted.  Will cont to monitor.

## 2020-03-31 NOTE — PLAN OF CARE
VN cued into patients room for q2h rounding - Patient is in no acute distress at this time.  Call light within reach. Cont pulse ox 98%. Will continue to monitor closely.

## 2020-03-31 NOTE — PLAN OF CARE
TN spoke with pt via telephone for continued discharge planning . Pt states he is doing better. Pt still requiring O2 at this time. Pt verbalizes no d/c needs at this time. CM will continue to follow for any d/c needs.

## 2020-04-01 PROBLEM — E11.9 TYPE 2 DIABETES MELLITUS WITHOUT COMPLICATION, WITHOUT LONG-TERM CURRENT USE OF INSULIN: Status: ACTIVE | Noted: 2020-04-01

## 2020-04-01 LAB
ALBUMIN SERPL BCP-MCNC: 3 G/DL (ref 3.5–5.2)
ALP SERPL-CCNC: 61 U/L (ref 55–135)
ALT SERPL W/O P-5'-P-CCNC: 110 U/L (ref 10–44)
ANION GAP SERPL CALC-SCNC: 13 MMOL/L (ref 8–16)
AST SERPL-CCNC: 49 U/L (ref 10–40)
BASOPHILS # BLD AUTO: ABNORMAL K/UL (ref 0–0.2)
BASOPHILS NFR BLD: 0 % (ref 0–1.9)
BILIRUB SERPL-MCNC: 0.4 MG/DL (ref 0.1–1)
BUN SERPL-MCNC: 20 MG/DL (ref 6–20)
CALCIUM SERPL-MCNC: 9.9 MG/DL (ref 8.7–10.5)
CHLORIDE SERPL-SCNC: 98 MMOL/L (ref 95–110)
CK MB SERPL-MCNC: 2.7 NG/ML (ref 0.1–6.5)
CK MB SERPL-RTO: 0.6 % (ref 0–5)
CK SERPL-CCNC: 477 U/L (ref 20–200)
CO2 SERPL-SCNC: 23 MMOL/L (ref 23–29)
CREAT SERPL-MCNC: 0.9 MG/DL (ref 0.5–1.4)
CRP SERPL-MCNC: 51.32 MG/L (ref 0–3.19)
D DIMER PPP IA.FEU-MCNC: 0.75 MG/L FEU
DIFFERENTIAL METHOD: ABNORMAL
EOSINOPHIL # BLD AUTO: ABNORMAL K/UL (ref 0–0.5)
EOSINOPHIL NFR BLD: 0 % (ref 0–8)
ERYTHROCYTE [DISTWIDTH] IN BLOOD BY AUTOMATED COUNT: 13.3 % (ref 11.5–14.5)
EST. GFR  (AFRICAN AMERICAN): >60 ML/MIN/1.73 M^2
EST. GFR  (NON AFRICAN AMERICAN): >60 ML/MIN/1.73 M^2
FERRITIN SERPL-MCNC: 1490 NG/ML (ref 20–300)
G6PD RBC-CCNT: 12.4 U/G HGB (ref 7–20.5)
GLUCOSE SERPL-MCNC: 177 MG/DL (ref 70–110)
HCT VFR BLD AUTO: 38.7 % (ref 40–54)
HGB BLD-MCNC: 12.4 G/DL (ref 14–18)
IMM GRANULOCYTES # BLD AUTO: ABNORMAL K/UL (ref 0–0.04)
IMM GRANULOCYTES NFR BLD AUTO: ABNORMAL % (ref 0–0.5)
LDH SERPL L TO P-CCNC: 488 U/L (ref 110–260)
LYMPHOCYTES # BLD AUTO: ABNORMAL K/UL (ref 1–4.8)
LYMPHOCYTES NFR BLD: 6 % (ref 18–48)
MAGNESIUM SERPL-MCNC: 2.7 MG/DL (ref 1.6–2.6)
MCH RBC QN AUTO: 23 PG (ref 27–31)
MCHC RBC AUTO-ENTMCNC: 32 G/DL (ref 32–36)
MCV RBC AUTO: 72 FL (ref 82–98)
METAMYELOCYTES NFR BLD MANUAL: 1 %
MONOCYTES # BLD AUTO: ABNORMAL K/UL (ref 0.3–1)
MONOCYTES NFR BLD: 0 % (ref 4–15)
NEUTROPHILS NFR BLD: 85 % (ref 38–73)
NEUTS BAND NFR BLD MANUAL: 8 %
NRBC BLD-RTO: 0 /100 WBC
PHOSPHATE SERPL-MCNC: 3.4 MG/DL (ref 2.7–4.5)
PLATELET # BLD AUTO: 387 K/UL (ref 150–350)
PMV BLD AUTO: 11.8 FL (ref 9.2–12.9)
POTASSIUM SERPL-SCNC: 4.2 MMOL/L (ref 3.5–5.1)
PROT SERPL-MCNC: 7.9 G/DL (ref 6–8.4)
RBC # BLD AUTO: 5.38 M/UL (ref 4.6–6.2)
SODIUM SERPL-SCNC: 134 MMOL/L (ref 136–145)
TROPONIN I SERPL DL<=0.01 NG/ML-MCNC: 0.01 NG/ML (ref 0–0.03)
WBC # BLD AUTO: 19.23 K/UL (ref 3.9–12.7)

## 2020-04-01 PROCEDURE — 11000001 HC ACUTE MED/SURG PRIVATE ROOM

## 2020-04-01 PROCEDURE — 86141 C-REACTIVE PROTEIN HS: CPT

## 2020-04-01 PROCEDURE — 82553 CREATINE MB FRACTION: CPT

## 2020-04-01 PROCEDURE — 85379 FIBRIN DEGRADATION QUANT: CPT

## 2020-04-01 PROCEDURE — 82550 ASSAY OF CK (CPK): CPT

## 2020-04-01 PROCEDURE — 85007 BL SMEAR W/DIFF WBC COUNT: CPT

## 2020-04-01 PROCEDURE — 84484 ASSAY OF TROPONIN QUANT: CPT

## 2020-04-01 PROCEDURE — 94761 N-INVAS EAR/PLS OXIMETRY MLT: CPT

## 2020-04-01 PROCEDURE — 25000003 PHARM REV CODE 250: Performed by: STUDENT IN AN ORGANIZED HEALTH CARE EDUCATION/TRAINING PROGRAM

## 2020-04-01 PROCEDURE — 99900035 HC TECH TIME PER 15 MIN (STAT)

## 2020-04-01 PROCEDURE — 63600175 PHARM REV CODE 636 W HCPCS: Performed by: STUDENT IN AN ORGANIZED HEALTH CARE EDUCATION/TRAINING PROGRAM

## 2020-04-01 PROCEDURE — 85027 COMPLETE CBC AUTOMATED: CPT

## 2020-04-01 PROCEDURE — 84100 ASSAY OF PHOSPHORUS: CPT

## 2020-04-01 PROCEDURE — 36415 COLL VENOUS BLD VENIPUNCTURE: CPT

## 2020-04-01 PROCEDURE — 83615 LACTATE (LD) (LDH) ENZYME: CPT

## 2020-04-01 PROCEDURE — 63700000 PHARM REV CODE 250 ALT 637 W/O HCPCS: Performed by: FAMILY MEDICINE

## 2020-04-01 PROCEDURE — 27000221 HC OXYGEN, UP TO 24 HOURS

## 2020-04-01 PROCEDURE — 83735 ASSAY OF MAGNESIUM: CPT

## 2020-04-01 PROCEDURE — 82728 ASSAY OF FERRITIN: CPT

## 2020-04-01 PROCEDURE — 80053 COMPREHEN METABOLIC PANEL: CPT

## 2020-04-01 RX ADMIN — METHYLPREDNISOLONE SODIUM SUCCINATE 30 MG: 40 INJECTION, POWDER, FOR SOLUTION INTRAMUSCULAR; INTRAVENOUS at 05:04

## 2020-04-01 RX ADMIN — METHYLPREDNISOLONE SODIUM SUCCINATE 30 MG: 40 INJECTION, POWDER, FOR SOLUTION INTRAMUSCULAR; INTRAVENOUS at 11:04

## 2020-04-01 RX ADMIN — ENOXAPARIN SODIUM 40 MG: 100 INJECTION SUBCUTANEOUS at 08:04

## 2020-04-01 RX ADMIN — AZITHROMYCIN MONOHYDRATE 500 MG: 250 TABLET ORAL at 08:04

## 2020-04-01 RX ADMIN — AMLODIPINE BESYLATE 10 MG: 5 TABLET ORAL at 08:04

## 2020-04-01 RX ADMIN — HYDROXYCHLOROQUINE SULFATE 400 MG: 200 TABLET, FILM COATED ORAL at 08:04

## 2020-04-01 NOTE — PLAN OF CARE
VN cued into patients room for   rounding - Patient is in no acute distress at this time.  Call light within reach. Will continue to monitor closely.

## 2020-04-01 NOTE — PROGRESS NOTES
[facility] medicine Progress Note    Admit Date: 3/29/2020   LOS: 2 days     SUBJECTIVE:     Overnight/interval history:  Decreased high-flow down to 6 L. approximately 96% oxygen saturation.  Endorses dyspnea on exertion.  Denies pain.  Tolerated diet.  Afebrile overnight with T-max 98.1°.  Positive 0.1 L per 24 hr.      Scheduled Meds:   amLODIPine  10 mg Oral Daily    azithromycin  500 mg Oral Daily    enoxaparin  40 mg Subcutaneous Daily    hydroxychloroquine  400 mg Oral Daily    methylPREDNISolone sodium succinate  30 mg Intravenous Q6H     Continuous Infusions:  PRN Meds:acetaminophen, benzonatate, influenza, ondansetron, pneumoc 13-patricia conj-dip cr(PF), sodium chloride 0.9%    Review of patient's allergies indicates:   Allergen Reactions    Iodine and iodide containing products Anaphylaxis       Review of Systems  Constitutional: Negative for chills and fever.   HENT: Negative for sore throat.    Eyes: Negative for blurred vision and double vision.   Respiratory: Negative for cough and shortness of breath.    Cardiovascular: Negative for chest pain.   Gastrointestinal: Negative for abdominal pain, constipation, diarrhea, heartburn, nausea and vomiting.   Genitourinary: Negative for dysuria and urgency.   Musculoskeletal: Negative for back pain and falls.   Skin: Negative for itching and rash.   Neurological: Negative for headaches.   Endo/Heme/Allergies: Does not bruise/bleed easily.   Psychiatric/Behavioral: The patient is not nervous/anxious.      OBJECTIVE:     Vital Signs (Most Recent)  Temp: 97.9 °F (36.6 °C) (04/01/20 1121)  Pulse: 70 (04/01/20 1121)  Resp: 19 (04/01/20 0718)  BP: 126/72 (04/01/20 1121)  SpO2: 95 % (04/01/20 1121)    Vital Signs Range (Last 24H):  Temp:  [97.6 °F (36.4 °C)-98.1 °F (36.7 °C)]   Pulse:  []   Resp:  [19-20]   BP: (126-142)/(70-85)   SpO2:  [93 %-97 %]     I & O (Last 24H):    Intake/Output Summary (Last 24 hours) at 4/1/2020 0056  Last data filed at 4/1/2020  0600  Gross per 24 hour   Intake 455 ml   Output 600 ml   Net -145 ml     Physical Exam:  Constitutional: He is oriented to person, place, and time and well-developed, well-nourished, and in no distress.   HENT:   Head: Normocephalic and atraumatic.   Pulmonary/Chest:   Observed symmetrical bilateral chest rise with unlabored breathing.   Abdominal: He exhibits no distension.   Musculoskeletal: Normal range of motion.   Neurological: He is alert and oriented to person, place, and time.     Laboratory:  CBC:   Recent Labs   Lab 04/01/20  0700   WBC 19.23*   RBC 5.38   HGB 12.4*   HCT 38.7*   *   MCV 72*   MCH 23.0*   MCHC 32.0     BMP:   Recent Labs   Lab 04/01/20  0700   *   *   K 4.2   CL 98   CO2 23   BUN 20   CREATININE 0.9   CALCIUM 9.9   MG 2.7*     CMP:   Recent Labs   Lab 04/01/20  0700   *   CALCIUM 9.9   ALBUMIN 3.0*   PROT 7.9   *   K 4.2   CO2 23   CL 98   BUN 20   CREATININE 0.9   ALKPHOS 61   *   AST 49*   BILITOT 0.4     LFTs:   Recent Labs   Lab 04/01/20  0700   *   AST 49*   ALKPHOS 61   BILITOT 0.4   PROT 7.9   ALBUMIN 3.0*     Coagulation: No results for input(s): LABPROT, INR, APTT in the last 168 hours.  Cardiac markers:   Recent Labs   Lab 04/01/20  0700   CPKMB 2.7   TROPONINI 0.010     ABGs: No results for input(s): PH, PCO2, PO2, HCO3, POCSATURATED, BE in the last 168 hours.  Microbiology Results (last 7 days)     Procedure Component Value Units Date/Time    Blood culture [[id number]] Collected:  03/29/20 1055    Order Status:  Completed Specimen:  Blood from Peripheral, Hand, Left Updated:  03/31/20 2212     Blood Culture, Routine No Growth to date      No Growth to date      No Growth to date    Blood culture [[id number]] Collected:  03/29/20 1040    Order Status:  Completed Specimen:  Blood from Peripheral, Foot, Left Updated:  03/31/20 2212     Blood Culture, Routine No Growth to date      No Growth to date      No Growth to date    Influenza A  & B by Molecular [[id number]] Collected:  03/29/20 0750    Order Status:  Completed Specimen:  Nasopharyngeal Swab Updated:  03/29/20 0823     Influenza A, Molecular Negative     Influenza B, Molecular Negative     Flu A & B Source Nasal swab        Specimen (12h ago, onward)    None        No results for input(s): COLORU, CLARITYU, SPECGRAV, PHUR, PROTEINUA, GLUCOSEU, BILIRUBINCON, BLOODU, WBCU, RBCU, BACTERIA, MUCUS, NITRITE, LEUKOCYTESUR, UROBILINOGEN, HYALINECASTS in the last 168 hours.    ASSESSMENT/PLAN:     Bilateral PNA most likely secondary to COVID-19   Lymph# - 0.9   CXR acquired shin - shows patchy bilateral ground-glass opacification    Influenza negative   Blood Cultures NGTD   COVID-19 positive  Day 4 antibiotics.  Day 3 steroids and Plaquenil  Resolving  Currently on 6 L nasal cannula.  Continue to wean as tolerated  Continue to monitor    Essential HTN   Home regimen: Amlodipine 10mg and Lisinopril-HCTZ 20-25mg PO daily     Code: Full  Diet:  Cardiac    Dispo:  Pending clinical improvement.  Continue to wean O2 supplementation.    Case discussed with attending physician, Dr. Peter Dawn Jr., D.O.  [facility],  [place], -2  [facility]

## 2020-04-01 NOTE — PLAN OF CARE
VN cued into patients room for q2h rounding - Patient is in no acute distress at this time.  Call light within reach. O2 sats 94 %. Will continue to monitor closely.

## 2020-04-01 NOTE — PLAN OF CARE
Problem: Adult Inpatient Plan of Care  Goal: Plan of Care Review  Outcome: Ongoing, Progressing  Mr. Lemus is resting and medications were given per orders. VSS. Cardiac and continuous pulse ox in use, sats in chart. Isolation precautions maintained. No complaints of SOB,NV/PAIN. Safety maintained.

## 2020-04-01 NOTE — PLAN OF CARE
TN spoke with pt via phone for continued discharge planning. Pt oxygen continued to be weaned. Pt now on 5L via chart. Pt voices no d/c needs at this time. CM will continue follow for any d/c needs, questions, or concerns.       04/01/20 1231   Discharge Reassessment   Assessment Type Discharge Planning Reassessment   Provided patient/caregiver education on the expected discharge date and the discharge plan Yes   Do you have any problems affording any of your prescribed medications? No   Discharge Plan A Home with family   Discharge Plan B Home with family   DME Needed Upon Discharge    (Oxygen?)   Patient choice form signed by patient/caregiver N/A   Anticipated Discharge Disposition Home   Can the patient/caregiver answer the patient profile reliably? Yes, cognitively intact   How does the patient rate their overall health at the present time? Good   Describe the patient's ability to walk at the present time. No restrictions

## 2020-04-01 NOTE — PLAN OF CARE
VN cued into patients room for rounding - patient resting in bed in no acute distress at this time. Call bell within reach. Will continue to monitor and be available to intervene PRN.

## 2020-04-01 NOTE — PLAN OF CARE
VN cued into pt's room for introduction. VN informed pt that VN would be working along side bedside nurse and PCT throughout shift. Level of present pain assessed. At present no distress noted.O2 cont to be admin via nasal cannula. Discussed with patient High fall risk protocol and interventions that have been initiated and cont be in place for safety. Patient verbalized clear understanding and cooperation using teach back method. Bed alarm presently activated and in use. Will cont to be available to patient and intervene prn.

## 2020-04-01 NOTE — PLAN OF CARE
Problem: Adult Inpatient Plan of Care  Goal: Plan of Care Review  Outcome: Ongoing, Progressing  Mr. Lemus is resting and medications were given per order. No complaints of PAIN/SOB/NV. Cardiac and Cont Pulse Ox continued. VSS. 6L HighFlow NC in place, sats in chart. Contact/Airborne/Droplet precaution continued. Safety maintained.

## 2020-04-01 NOTE — PLAN OF CARE
VIRTUAL NURSE 2 HOUR ROUNDS:  Cued into patient's room.  Patient resting comfortably in bed with eyes closed; respirations even and unlabored. Cont. Pulse ox 94 %.  No distress noted.  Will cont to monitor.

## 2020-04-01 NOTE — PLAN OF CARE
VN cued into patients room for q2h rounding - Patient is in no acute distress at this time.  Call light within reach. Cont.   Pulse ox 94 % Will continue to monitor closely.

## 2020-04-02 VITALS
OXYGEN SATURATION: 94 % | BODY MASS INDEX: 40.1 KG/M2 | WEIGHT: 296.06 LBS | RESPIRATION RATE: 19 BRPM | HEIGHT: 72 IN | HEART RATE: 69 BPM | TEMPERATURE: 98 F | SYSTOLIC BLOOD PRESSURE: 140 MMHG | DIASTOLIC BLOOD PRESSURE: 77 MMHG

## 2020-04-02 LAB
ALBUMIN SERPL BCP-MCNC: 2.9 G/DL (ref 3.5–5.2)
ALP SERPL-CCNC: 57 U/L (ref 55–135)
ALT SERPL W/O P-5'-P-CCNC: 99 U/L (ref 10–44)
ANION GAP SERPL CALC-SCNC: 13 MMOL/L (ref 8–16)
AST SERPL-CCNC: 36 U/L (ref 10–40)
BACTERIA BLD CULT: NORMAL
BACTERIA BLD CULT: NORMAL
BASOPHILS # BLD AUTO: ABNORMAL K/UL (ref 0–0.2)
BASOPHILS NFR BLD: 0 % (ref 0–1.9)
BILIRUB SERPL-MCNC: 0.4 MG/DL (ref 0.1–1)
BUN SERPL-MCNC: 21 MG/DL (ref 6–20)
CALCIUM SERPL-MCNC: 9.8 MG/DL (ref 8.7–10.5)
CHLORIDE SERPL-SCNC: 97 MMOL/L (ref 95–110)
CK MB SERPL-MCNC: 1.6 NG/ML (ref 0.1–6.5)
CK MB SERPL-RTO: 0.8 % (ref 0–5)
CK SERPL-CCNC: 197 U/L (ref 20–200)
CO2 SERPL-SCNC: 25 MMOL/L (ref 23–29)
CREAT SERPL-MCNC: 0.9 MG/DL (ref 0.5–1.4)
CRP SERPL-MCNC: 19.97 MG/L (ref 0–3.19)
D DIMER PPP IA.FEU-MCNC: 1.22 MG/L FEU
DIFFERENTIAL METHOD: ABNORMAL
EOSINOPHIL # BLD AUTO: ABNORMAL K/UL (ref 0–0.5)
EOSINOPHIL NFR BLD: 0 % (ref 0–8)
ERYTHROCYTE [DISTWIDTH] IN BLOOD BY AUTOMATED COUNT: 13.4 % (ref 11.5–14.5)
EST. GFR  (AFRICAN AMERICAN): >60 ML/MIN/1.73 M^2
EST. GFR  (NON AFRICAN AMERICAN): >60 ML/MIN/1.73 M^2
ESTIMATED AVG GLUCOSE: 140 MG/DL (ref 68–131)
FERRITIN SERPL-MCNC: 920 NG/ML (ref 20–300)
GLUCOSE SERPL-MCNC: 154 MG/DL (ref 70–110)
HBA1C MFR BLD HPLC: 6.5 % (ref 4–5.6)
HCT VFR BLD AUTO: 39.6 % (ref 40–54)
HGB BLD-MCNC: 12.5 G/DL (ref 14–18)
IMM GRANULOCYTES # BLD AUTO: ABNORMAL K/UL (ref 0–0.04)
IMM GRANULOCYTES NFR BLD AUTO: ABNORMAL % (ref 0–0.5)
LDH SERPL L TO P-CCNC: 438 U/L (ref 110–260)
LYMPHOCYTES # BLD AUTO: ABNORMAL K/UL (ref 1–4.8)
LYMPHOCYTES NFR BLD: 2 % (ref 18–48)
MAGNESIUM SERPL-MCNC: 2.6 MG/DL (ref 1.6–2.6)
MCH RBC QN AUTO: 22.8 PG (ref 27–31)
MCHC RBC AUTO-ENTMCNC: 31.6 G/DL (ref 32–36)
MCV RBC AUTO: 72 FL (ref 82–98)
MONOCYTES # BLD AUTO: ABNORMAL K/UL (ref 0.3–1)
MONOCYTES NFR BLD: 2 % (ref 4–15)
NEUTROPHILS NFR BLD: 91 % (ref 38–73)
NEUTS BAND NFR BLD MANUAL: 5 %
NRBC BLD-RTO: 0 /100 WBC
PHOSPHATE SERPL-MCNC: 4.3 MG/DL (ref 2.7–4.5)
PLATELET # BLD AUTO: 504 K/UL (ref 150–350)
PLATELET BLD QL SMEAR: ABNORMAL
PMV BLD AUTO: 11.9 FL (ref 9.2–12.9)
POTASSIUM SERPL-SCNC: 4.4 MMOL/L (ref 3.5–5.1)
PROT SERPL-MCNC: 7.4 G/DL (ref 6–8.4)
RBC # BLD AUTO: 5.48 M/UL (ref 4.6–6.2)
SODIUM SERPL-SCNC: 135 MMOL/L (ref 136–145)
TROPONIN I SERPL DL<=0.01 NG/ML-MCNC: <0.006 NG/ML (ref 0–0.03)
WBC # BLD AUTO: 16.89 K/UL (ref 3.9–12.7)

## 2020-04-02 PROCEDURE — 25000003 PHARM REV CODE 250: Performed by: STUDENT IN AN ORGANIZED HEALTH CARE EDUCATION/TRAINING PROGRAM

## 2020-04-02 PROCEDURE — 83036 HEMOGLOBIN GLYCOSYLATED A1C: CPT

## 2020-04-02 PROCEDURE — 27000221 HC OXYGEN, UP TO 24 HOURS

## 2020-04-02 PROCEDURE — 86141 C-REACTIVE PROTEIN HS: CPT

## 2020-04-02 PROCEDURE — 84484 ASSAY OF TROPONIN QUANT: CPT

## 2020-04-02 PROCEDURE — 36415 COLL VENOUS BLD VENIPUNCTURE: CPT

## 2020-04-02 PROCEDURE — 82553 CREATINE MB FRACTION: CPT

## 2020-04-02 PROCEDURE — 82728 ASSAY OF FERRITIN: CPT

## 2020-04-02 PROCEDURE — 94761 N-INVAS EAR/PLS OXIMETRY MLT: CPT

## 2020-04-02 PROCEDURE — 63700000 PHARM REV CODE 250 ALT 637 W/O HCPCS: Performed by: FAMILY MEDICINE

## 2020-04-02 PROCEDURE — 84100 ASSAY OF PHOSPHORUS: CPT

## 2020-04-02 PROCEDURE — 63600175 PHARM REV CODE 636 W HCPCS: Performed by: STUDENT IN AN ORGANIZED HEALTH CARE EDUCATION/TRAINING PROGRAM

## 2020-04-02 PROCEDURE — 80053 COMPREHEN METABOLIC PANEL: CPT

## 2020-04-02 PROCEDURE — 85007 BL SMEAR W/DIFF WBC COUNT: CPT

## 2020-04-02 PROCEDURE — 83735 ASSAY OF MAGNESIUM: CPT

## 2020-04-02 PROCEDURE — 83615 LACTATE (LD) (LDH) ENZYME: CPT

## 2020-04-02 PROCEDURE — 82550 ASSAY OF CK (CPK): CPT

## 2020-04-02 PROCEDURE — 85379 FIBRIN DEGRADATION QUANT: CPT

## 2020-04-02 PROCEDURE — 85027 COMPLETE CBC AUTOMATED: CPT

## 2020-04-02 RX ORDER — AZITHROMYCIN 250 MG/1
250 TABLET, FILM COATED ORAL DAILY
Qty: 3 TABLET | Refills: 0 | Status: SHIPPED | OUTPATIENT
Start: 2020-04-03 | End: 2020-04-06

## 2020-04-02 RX ORDER — BENZONATATE 100 MG/1
100 CAPSULE ORAL 3 TIMES DAILY PRN
Qty: 30 CAPSULE | Refills: 0 | Status: SHIPPED | OUTPATIENT
Start: 2020-04-02 | End: 2020-04-12

## 2020-04-02 RX ORDER — METFORMIN HYDROCHLORIDE 500 MG/1
500 TABLET ORAL 2 TIMES DAILY WITH MEALS
Qty: 180 TABLET | Refills: 3 | Status: SHIPPED | OUTPATIENT
Start: 2020-04-02 | End: 2022-09-12

## 2020-04-02 RX ADMIN — AMLODIPINE BESYLATE 10 MG: 5 TABLET ORAL at 09:04

## 2020-04-02 RX ADMIN — METHYLPREDNISOLONE SODIUM SUCCINATE 77 MG: 40 INJECTION, POWDER, FOR SOLUTION INTRAMUSCULAR; INTRAVENOUS at 05:04

## 2020-04-02 RX ADMIN — METHYLPREDNISOLONE SODIUM SUCCINATE 30 MG: 40 INJECTION, POWDER, FOR SOLUTION INTRAMUSCULAR; INTRAVENOUS at 05:04

## 2020-04-02 RX ADMIN — AZITHROMYCIN MONOHYDRATE 500 MG: 250 TABLET ORAL at 09:04

## 2020-04-02 RX ADMIN — HYDROXYCHLOROQUINE SULFATE 400 MG: 200 TABLET, FILM COATED ORAL at 09:04

## 2020-04-02 RX ADMIN — METHYLPREDNISOLONE SODIUM SUCCINATE 30 MG: 40 INJECTION, POWDER, FOR SOLUTION INTRAMUSCULAR; INTRAVENOUS at 12:04

## 2020-04-02 NOTE — PLAN OF CARE
Patient received on    2Lpm NC with SpO2 94   %. Pt with no apparent distress noted. Will continue to monitor.

## 2020-04-02 NOTE — PLAN OF CARE
VN cued into room for q2h rounding.  Pt sitting up in chair.  NADN. VN will continue to follow and be available as needed.

## 2020-04-02 NOTE — PLAN OF CARE
VN cued into room for q2h rounding.  Pt resting comfortably in bed.  NADN.  Pt on room air. VN will continue to follow and be available as needed.

## 2020-04-02 NOTE — NURSING
Patient resting comfortably in bed with eyes closed; respirations even and unlabored.  No distress noted.  Will cont to monitor.

## 2020-04-02 NOTE — PLAN OF CARE
Patient has received discharge instructions. Prescriptions received. Instructions reviewed with pt using teachback method. All questions answered to pt satisfaction. IV access and telemetry removed per floor nurse. Transport requested for discharge. Pt has face mask to wear as he is transported out of the hospital

## 2020-04-02 NOTE — TREATMENT PLAN
Virtual visited the patient and explained all discharge instructions. Patient was given instructions to self quarantine at home for 14 days. Patient will also enroll in home symptom monitoring. All questions were answered.     Bright Lindsay MD  [facility] HO-1

## 2020-04-02 NOTE — NURSING
Pt resting in bed talking on phone upon initial encounter. 5L NC in p[ace. No c/o SOB. Intermittent dry cough noted. Compliant with HS medication. Tele in place. Inform that virtual nurse will bed round q2h. Verbalized understanding. Safety precautions in place. Call light in reach. Instructed to call for assistance. Will continue to monitor.

## 2020-04-02 NOTE — PLAN OF CARE
TN spoke to  regarding order Home O2 eval for pt so we can begin to set home O2 if pt's qualifies, MD to order home O2 eval.

## 2020-04-02 NOTE — PLAN OF CARE
VN cued into room for q2h rounding.  Pt sitting up in chair.  NADN.  Oxygen in place. VN will continue to follow and be available as needed.

## 2020-04-02 NOTE — PROGRESS NOTES
[facility] Medicine Progress Note    Admit Date: 3/29/2020   LOS: 3 days     SUBJECTIVE:     Overnight/interval history:  Doing well this morning.  Has been weaned off of oxygen.  He is tolerating diet.  Denies any shortness of breath.  Patient was dancing during virtual rounds.  Patient was counseled on self quarantine for 14 days upon discharge.      Scheduled Meds:   amLODIPine  10 mg Oral Daily    azithromycin  500 mg Oral Daily    enoxaparin  40 mg Subcutaneous Daily    hydroxychloroquine  400 mg Oral Daily    methylPREDNISolone sodium succinate  30 mg Intravenous Q6H     Continuous Infusions:  PRN Meds:acetaminophen, benzonatate, influenza, ondansetron, pneumoc 13-patricia conj-dip cr(PF), sodium chloride 0.9%    Review of patient's allergies indicates:   Allergen Reactions    Iodine and iodide containing products Anaphylaxis       Review of Systems  Constitutional: Negative for chills and fever.   HENT: Negative for sore throat.    Eyes: Negative for blurred vision and double vision.   Respiratory: Negative for cough and shortness of breath.    Cardiovascular: Negative for chest pain.   Gastrointestinal: Negative for abdominal pain, constipation, diarrhea, heartburn, nausea and vomiting.   Genitourinary: Negative for dysuria and urgency.   Musculoskeletal: Negative for back pain and falls.   Skin: Negative for itching and rash.   Neurological: Negative for headaches.   Endo/Heme/Allergies: Does not bruise/bleed easily.   Psychiatric/Behavioral: The patient is not nervous/anxious.      OBJECTIVE:     Vital Signs (Most Recent)  Temp: 97.8 °F (36.6 °C) (04/02/20 1203)  Pulse: 75 (04/02/20 1203)  Resp: (!) 24(24) (04/02/20 1203)  BP: 133/87 (04/02/20 1203)  SpO2: (!) 94 % (04/02/20 1203)    Vital Signs Range (Last 24H):  Temp:  [97.7 °F (36.5 °C)-99.2 °F (37.3 °C)]   Pulse:  [62-85]   Resp:  [20-24]   BP: (121-134)/(59-87)   SpO2:  [94 %-97 %]     I & O (Last 24H):    Intake/Output Summary (Last 24 hours) at  4/2/2020 1717  Last data filed at 4/2/2020 1205  Gross per 24 hour   Intake 300 ml   Output 1250 ml   Net -950 ml     In person exam not performed to conserve PPE.   Physical Exam:  Constitutional: He is oriented to person, place, and time and well-developed, well-nourished, and in no distress.   HENT:   Head: Normocephalic and atraumatic.   Pulmonary/Chest:   Observed symmetrical bilateral chest rise with unlabored breathing.   Abdominal: He exhibits no distension.   Musculoskeletal: Normal range of motion.   Neurological: He is alert and oriented to person, place, and time.     Laboratory:  CBC:   Recent Labs   Lab 04/02/20  0539   WBC 16.89*   RBC 5.48   HGB 12.5*   HCT 39.6*   *   MCV 72*   MCH 22.8*   MCHC 31.6*     BMP:   Recent Labs   Lab 04/02/20  0539   *   *   K 4.4   CL 97   CO2 25   BUN 21*   CREATININE 0.9   CALCIUM 9.8   MG 2.6     CMP:   Recent Labs   Lab 04/02/20  0539   *   CALCIUM 9.8   ALBUMIN 2.9*   PROT 7.4   *   K 4.4   CO2 25   CL 97   BUN 21*   CREATININE 0.9   ALKPHOS 57   ALT 99*   AST 36   BILITOT 0.4     LFTs:   Recent Labs   Lab 04/02/20  0539   ALT 99*   AST 36   ALKPHOS 57   BILITOT 0.4   PROT 7.4   ALBUMIN 2.9*     Coagulation: No results for input(s): LABPROT, INR, APTT in the last 168 hours.  Cardiac markers:   Recent Labs   Lab 04/02/20  0539   CPKMB 1.6   TROPONINI <0.006     ABGs: No results for input(s): PH, PCO2, PO2, HCO3, POCSATURATED, BE in the last 168 hours.  Microbiology Results (last 7 days)     Procedure Component Value Units Date/Time    Blood culture [[id number]] Collected:  03/29/20 1040    Order Status:  Completed Specimen:  Blood from Peripheral, Foot, Left Updated:  04/01/20 2212     Blood Culture, Routine No Growth to date      No Growth to date      No Growth to date      No Growth to date    Blood culture [[id number]] Collected:  03/29/20 1055    Order Status:  Completed Specimen:  Blood from Peripheral, Hand, Left Updated:   04/01/20 2212     Blood Culture, Routine No Growth to date      No Growth to date      No Growth to date      No Growth to date    Influenza A & B by Molecular [[id number]] Collected:  03/29/20 0750    Order Status:  Completed Specimen:  Nasopharyngeal Swab Updated:  03/29/20 0823     Influenza A, Molecular Negative     Influenza B, Molecular Negative     Flu A & B Source Nasal swab        Specimen (12h ago, onward)    None          ASSESSMENT/PLAN:     Bilateral PNA most likely secondary to COVID-19   Lymph# - 0.9   CXR acquired shin - shows patchy bilateral ground-glass opacification    Influenza negative   Blood Cultures NGTD   COVID-19 positive  Day 5 antibiotics.  Day 4 steroids and Plaquenil  Resolving  Currently on 2 L nasal cannula.  Continue to wean as tolerated  Continue to monitor  Amb O2 today. Wean O2.     Essential HTN   Home regimen: Amlodipine 10mg and Lisinopril-HCTZ 20-25mg PO daily     T2DM  -A1c 6.5%  -patient counseled on the importance of exercise and eating a low carb diet to control diabetes   -will start metformin 500mg BID on discharge  -follow-up with PCP on discharge      Code: Full  Diet:  Cardiac    Dispo:  Discharge today after amb O2     Case discussed with attending physician, Dr. Peter Lindsay MD  [facility] HO-1

## 2020-04-02 NOTE — PLAN OF CARE
VN cued into patients room for q2h rounding - unable to visualize pt and respirations. Pt states he is doing okay. Bedside nurse, gail, notified of vn unable to visualize pt and respirations.

## 2020-04-02 NOTE — NURSING
Home O2 eval done at noon per orders and placed under Home O2 eval in chart, but not to br found. The results are:    Rm air at rest-94% POX  Exertion on room air-89% POX, Pulse 107  Exertion on 2L NC-89%, Pulse 117  POX on recovery-94%

## 2020-04-02 NOTE — PLAN OF CARE
Notified by bedside nurse, gail, that br light is on in pt's room but the way the room is set up VN may still not be able to visualize pt and that pt does not want any additional light on. VN attempted to round on pt. Room still too dark to visualize pt and respirations. Bedside nurse, gail, notified and rounding deferred to bedside nurse for remainder of shift.

## 2020-04-02 NOTE — PLAN OF CARE
Discharge order noted, no HH or DME noted.    Discharge rounds on patient. Discussed followup appointments, blue discharge folder, discharge nurse will go over home medications and reasons for medications and final discharge instructions. All patient/caregiver questions answered. Patient verbalized understanding.         04/02/20 4697   Final Note   Assessment Type Final Discharge Note   Anticipated Discharge Disposition Home   What phone number can be called within the next 1-3 days to see how you are doing after discharge? [phone]   Hospital Follow Up  Appt(s) scheduled? No   Discharge plans and expectations educations in teach back method with documentation complete? Yes   Right Care Referral Info   Post Acute Recommendation No Care

## 2020-04-02 NOTE — PROGRESS NOTES
[facility] medicine Progress Note    Admit Date: 3/29/2020   LOS: 3 days     SUBJECTIVE:       Scheduled Meds:   amLODIPine  10 mg Oral Daily    azithromycin  500 mg Oral Daily    enoxaparin  40 mg Subcutaneous Daily    hydroxychloroquine  400 mg Oral Daily    methylPREDNISolone sodium succinate  30 mg Intravenous Q6H     Continuous Infusions:  PRN Meds:acetaminophen, benzonatate, influenza, ondansetron, pneumoc 13-patricia conj-dip cr(PF), sodium chloride 0.9%    Review of patient's allergies indicates:   Allergen Reactions    Iodine and iodide containing products Anaphylaxis       Review of Systems  Constitutional: Negative for chills and fever.   HENT: Negative for sore throat.    Eyes: Negative for blurred vision and double vision.   Respiratory: Negative for cough and shortness of breath.    Cardiovascular: Negative for chest pain.   Gastrointestinal: Negative for abdominal pain, constipation, diarrhea, heartburn, nausea and vomiting.   Genitourinary: Negative for dysuria and urgency.   Musculoskeletal: Negative for back pain and falls.   Skin: Negative for itching and rash.   Neurological: Negative for headaches.   Endo/Heme/Allergies: Does not bruise/bleed easily.   Psychiatric/Behavioral: The patient is not nervous/anxious.      OBJECTIVE:     Vital Signs (Most Recent)  Temp: 97.8 °F (36.6 °C) (04/02/20 0644)  Pulse: 67 (04/02/20 0754)  Resp: 20 (04/02/20 0644)  BP: 134/86 (04/02/20 0644)  SpO2: (!) 94 % (04/02/20 0754)    Vital Signs Range (Last 24H):  Temp:  [97.7 °F (36.5 °C)-99.2 °F (37.3 °C)]   Pulse:  [62-87]   Resp:  [20]   BP: (121-134)/(59-86)   SpO2:  [94 %-97 %]     I & O (Last 24H):    Intake/Output Summary (Last 24 hours) at 4/2/2020 0905  Last data filed at 4/2/2020 0230  Gross per 24 hour   Intake 300 ml   Output 600 ml   Net -300 ml     Physical Exam:  Constitutional: He is oriented to person, place, and time and well-developed, well-nourished, and in no distress.   HENT:   Head:  Normocephalic and atraumatic.   Pulmonary/Chest:   Observed symmetrical bilateral chest rise with unlabored breathing.   Abdominal: He exhibits no distension.   Musculoskeletal: Normal range of motion.   Neurological: He is alert and oriented to person, place, and time.     Laboratory:  CBC:   Recent Labs   Lab 04/02/20  0539   WBC 16.89*   RBC 5.48   HGB 12.5*   HCT 39.6*   *   MCV 72*   MCH 22.8*   MCHC 31.6*     BMP:   Recent Labs   Lab 04/02/20  0539   *   *   K 4.4   CL 97   CO2 25   BUN 21*   CREATININE 0.9   CALCIUM 9.8   MG 2.6     CMP:   Recent Labs   Lab 04/02/20  0539   *   CALCIUM 9.8   ALBUMIN 2.9*   PROT 7.4   *   K 4.4   CO2 25   CL 97   BUN 21*   CREATININE 0.9   ALKPHOS 57   ALT 99*   AST 36   BILITOT 0.4     LFTs:   Recent Labs   Lab 04/02/20  0539   ALT 99*   AST 36   ALKPHOS 57   BILITOT 0.4   PROT 7.4   ALBUMIN 2.9*     Coagulation: No results for input(s): LABPROT, INR, APTT in the last 168 hours.  Cardiac markers:   Recent Labs   Lab 04/02/20  0539   CPKMB 1.6   TROPONINI <0.006     ABGs: No results for input(s): PH, PCO2, PO2, HCO3, POCSATURATED, BE in the last 168 hours.  Microbiology Results (last 7 days)     Procedure Component Value Units Date/Time    Blood culture [[id number]] Collected:  03/29/20 1040    Order Status:  Completed Specimen:  Blood from Peripheral, Foot, Left Updated:  04/01/20 2212     Blood Culture, Routine No Growth to date      No Growth to date      No Growth to date      No Growth to date    Blood culture [[id number]] Collected:  03/29/20 1055    Order Status:  Completed Specimen:  Blood from Peripheral, Hand, Left Updated:  04/01/20 2212     Blood Culture, Routine No Growth to date      No Growth to date      No Growth to date      No Growth to date    Influenza A & B by Molecular [[id number]] Collected:  03/29/20 0750    Order Status:  Completed Specimen:  Nasopharyngeal Swab Updated:  03/29/20 0823     Influenza A, Molecular Negative      Influenza B, Molecular Negative     Flu A & B Source Nasal swab        Specimen (12h ago, onward)    None        No results for input(s): COLORU, CLARITYU, SPECGRAV, PHUR, PROTEINUA, GLUCOSEU, BILIRUBINCON, BLOODU, WBCU, RBCU, BACTERIA, MUCUS, NITRITE, LEUKOCYTESUR, UROBILINOGEN, HYALINECASTS in the last 168 hours.    ASSESSMENT/PLAN:     Bilateral PNA most likely secondary to COVID-19   Lymph# - 0.9   CXR acquired shin - shows patchy bilateral ground-glass opacification    Influenza negative   Blood Cultures NGTD   COVID-19 positive  Day 5 antibiotics.  Day 4 steroids and Plaquenil  Resolving  Currently on 2 L nasal cannula.  Continue to wean as tolerated  Continue to monitor    Essential HTN   Home regimen: Amlodipine 10mg and Lisinopril-HCTZ 20-25mg PO daily     Code: Full  Diet:  Cardiac    Dispo:  Pending clinical improvement.  Continue to wean O2 supplementation.    Case discussed with attending physician, Dr. Peter Dawn Jr., D.O.  [facility],  [place], -2  [facility]

## 2020-04-02 NOTE — PLAN OF CARE
VN cued into room for q2h rounding.  Pt resting comfortably in bed.  NADN.  Pt on room air, states no SOB.  VN will continue to follow and be available as needed.

## 2020-04-02 NOTE — PLAN OF CARE
TN spoke with pt via phone who states he has been on room air and moving around the room without any complaints of SOB. Pt states he hopes he can remain on RA and be discharged with out any O2.

## 2020-04-02 NOTE — PLAN OF CARE
VN cued into room for q2h rounding.  Pt resting comfortably in bed.  Pt states he's anticipating discharge today.  Call light within reach, fall precautions maintained.  VN will continue to follow and be available as needed.

## 2020-05-03 NOTE — DISCHARGE SUMMARY
[facility]   Discharge Summary  [facility]    Attending Physician: Dr. Compa Green  Resident: Aleksey Michele    Date of Admit: 3/29/2020  Date of Discharge: 4/2/20    Condition: Stable  Disposition: Home or Self Care  Discharge Diagnoses     Patient Active Problem List   Diagnosis    Obese    Abnormal EKG    Slow heart beat    Hypertension    Palpitations    Pneumonia of both lower lobes due to infectious organism    COVID-19 virus infection    Type 2 diabetes mellitus without complication, without long-term current use of insulin     Consultants and Procedures   None    Brief History of Present Illness   Patient is a 40 y.o. male with pmhx HTN, who presents who presents with cough, shortness of breath and fever x1 week.  Patient denies any other symptoms similar to this in the past.  Patient is not immunocompromised, no chronic lung disease or history of malignancy.  Patient states that his shortness of breath is exacerbated by exertion and also worse at night.  Nothing relieves the patient's symptoms.  In the ED, temp 101°, blood pressure 160/87, pulse 105, respiratory rate 24, oxygen saturation 91% on room air.  Patient started on ceftriaxone azithromycin.  Blood cultures obtained.  Influenza negative.  Family medicine then consulted for admission.  Hospital Course By Problem with Pertinent Findings     Vital Signs (Most Recent)  Temp: 97.8 °F (36.6 °C) (04/02/20 1203)  Pulse: 75 (04/02/20 1203)  Resp: (!) 24(24) (04/02/20 1203)  BP: 133/87 (04/02/20 1203)  SpO2: (!) 94 % (04/02/20 1203)     Vital Signs Range (Last 24H):  Temp:  [97.7 °F (36.5 °C)-99.2 °F (37.3 °C)]   Pulse:  [62-85]   Resp:  [20-24]   BP: (121-134)/(59-87)   SpO2:  [94 %-97 %]      I & O (Last 24H):     Intake/Output Summary (Last 24 hours) at 4/2/2020 1717  Last data filed at 4/2/2020 1205      Gross per 24 hour   Intake 300 ml   Output 1250 ml   Net -950 ml      In person exam not performed to  conserve PPE.   Physical Exam:  Constitutional: He is oriented to person, place, and time and well-developed, well-nourished, and in no distress.   HENT:   Head: Normocephalic and atraumatic.   Pulmonary/Chest:   Observed symmetrical bilateral chest rise with unlabored breathing.   Abdominal: He exhibits no distension.   Musculoskeletal: Normal range of motion.   Neurological: He is alert and oriented to person, place, and time.      Laboratory:  CBC:       Recent Labs   Lab 04/02/20  0539   WBC 16.89*   RBC 5.48   HGB 12.5*   HCT 39.6*   *   MCV 72*   MCH 22.8*   MCHC 31.6*      BMP:       Recent Labs   Lab 04/02/20  0539   *   *   K 4.4   CL 97   CO2 25   BUN 21*   CREATININE 0.9   CALCIUM 9.8   MG 2.6      CMP:       Recent Labs   Lab 04/02/20  0539   *   CALCIUM 9.8   ALBUMIN 2.9*   PROT 7.4   *   K 4.4   CO2 25   CL 97   BUN 21*   CREATININE 0.9   ALKPHOS 57   ALT 99*   AST 36   BILITOT 0.4      LFTs:       Recent Labs   Lab 04/02/20  0539   ALT 99*   AST 36   ALKPHOS 57   BILITOT 0.4   PROT 7.4   ALBUMIN 2.9*      Coagulation: No results for input(s): LABPROT, INR, APTT in the last 168 hours.  Cardiac markers:       Recent Labs   Lab 04/02/20  0539   CPKMB 1.6   TROPONINI <0.006      ABGs: No results for input(s): PH, PCO2, PO2, HCO3, POCSATURATED, BE in the last 168 hours.           Microbiology Results (last 7 days)      Procedure Component Value Units Date/Time     Blood culture [[id number]] Collected:  03/29/20 1040     Order Status:  Completed Specimen:  Blood from Peripheral, Foot, Left Updated:  04/01/20 2212       Blood Culture, Routine No Growth to date         No Growth to date         No Growth to date         No Growth to date     Blood culture [[id number]] Collected:  03/29/20 1055     Order Status:  Completed Specimen:  Blood from Peripheral, Hand, Left Updated:  04/01/20 2212       Blood Culture, Routine No Growth to date         No Growth to date         No Growth  to date         No Growth to date     Influenza A & B by Molecular [[id number]] Collected:  03/29/20 0750     Order Status:  Completed Specimen:  Nasopharyngeal Swab Updated:  03/29/20 0823       Influenza A, Molecular Negative       Influenza B, Molecular Negative       Flu A & B Source Nasal swab              Specimen (12h ago, onward)     None             ASSESSMENT/PLAN:      Bilateral PNA most likely secondary to COVID-19   Lymph# - 0.9   CXR acquired shin - shows patchy bilateral ground-glass opacification    Influenza negative   Blood Cultures NGTD   COVID-19 positive  Day 5 antibiotics.  Day 4 steroids and Plaquenil  Resolving  Currently on 2 L nasal cannula.  Continued to wean as tolerated  Continued to monitor  Amb O2 today. Weaned O2.        Acute pulmonary Edema  Shown on cxr  Resolving with current treatment during the hospital stay    Essential HTN   Home regimen: Amlodipine 10mg and Lisinopril-HCTZ 20-25mg PO daily      T2DM  -A1c 6.5%  -patient counseled on the importance of exercise and eating a low carb diet to control diabetes   -started metformin 500mg BID on discharge  -follow-up with PCP on discharge            Discharge Medications     Discharge Medication List as of 4/2/2020  6:13 PM      START taking these medications    Details   azithromycin (Z-YESSI) 250 MG tablet Take 1 tablet (250 mg total) by mouth once daily. for 3 days, Starting Fri 4/3/2020, Until Mon 4/6/2020, Normal      benzonatate (TESSALON) 100 MG capsule Take 1 capsule (100 mg total) by mouth 3 (three) times daily as needed for Cough., Starting Thu 4/2/2020, Until Sun 4/12/2020, Normal         CONTINUE these medications which have NOT CHANGED    Details   amLODIPine (NORVASC) 10 MG tablet Take 1 tablet (10 mg total) by mouth once daily., Starting Tue 1/30/2018, Normal      lisinopril-hydrochlorothiazide (PRINZIDE,ZESTORETIC) 20-25 mg Tab Take 1 tablet by mouth once daily., Starting Thu 1/25/2018, Until Sun 3/29/2020, Normal            Discharge Information:   Patient Instructions:   Discharge Procedure Orders   Ambulatory referral[facility]consult to [facility]   Standing Status: Future   Referral Priority: Routine Referral Type: Consultation   Referral Reason: Specialty Services Required   Number of Visits Requested: 1     Diet diabetic     Diet Cardiac     Notify your health care provider if you experience any of the following:  temperature >100.4     Notify your health care provider if you experience any of the following:  persistent nausea and vomiting or diarrhea     Notify your health care provider if you experience any of the following:  severe uncontrolled pain     Notify your health care provider if you experience any of the following:  redness, tenderness, or signs of infection (pain, swelling, redness, odor or green/yellow discharge around incision site)     Notify your health care provider if you experience any of the following:  difficulty breathing or increased cough     Notify your health care provider if you experience any of the following:  severe persistent headache     Notify your health care provider if you experience any of the following:  worsening rash     Notify your health care provider if you experience any of the following:  persistent dizziness, light-headedness, or visual disturbances     Notify your health care provider if you experience any of the following:  increased confusion or weakness     COVID-19 Home Symptom Monitoring  - Duration (days): 14     Order Specific Question Answer Comments   Duration (days) 14      Activity as tolerated       Follow-up Information     Schedule an appointment as soon as possible for a visit with [facility].    Specialty:  Family Medicine  Why:  Hospital followup  Contact information:  [address] [place] [address] [phone]             Go to [facility].    Specialty:  Emergency Medicine  Why:  If symptoms  worsen  Contact information:  [address] [place] [address] [phone]           [facility].    Why:  hospital follow up-PCP suggestion  Contact information:  [address] [facility] [address] [phone]             Caitlyn Gaston MD.    Specialty:  Internal Medicine  Why:  hospital follow up-PCP suggestion  Contact information:  [address]  [address]  [facility]  [facility] [address] [phone]                 Discharge planning was greater than 35mins

## 2020-05-05 NOTE — PHYSICIAN QUERY
"PT Name: Maxine Lemus  MR #: [id number]    Physician Query Form - Emergency Medicine Diagnosis Clarification     CDS/: Radha Strong RN CDI          Contact information: [email or web address]  This form is a permanent document in the medical record.     Query Date: May 5, 2020    By submitting this query, we are merely seeking further clarification of documentation.  Please utilize your independent clinical judgment when addressing the question(s) below.      The Medical record contains the following:     Diagnosis Supporting Clinical Information Location in Medical Record   Acute pulmonary edema Acute pulmonary edema     Patient is still hypoxic on room air.  Patient requiring oxygen at 2 liters/minute to keep sats around 95-97%.     Pulmonary:  Mild respiratory distress     Respiratory - (+) cough (+) congestion, (+) shortness of breath, (-) wheezing    Portable chest xray     Patchy bilateral ground-glass opacification suspicious for pneumonia versus viral pneumonitis. Component of pulmonary edema also likely present. No pneumothorax or pleural effusion. Heart normal size.    Community Acquired Pneumonia 2/2 Suspected COVID-19    ER MD Notes 3/29  Malcolm Woods MD /   KIMBERLY Simmons MD     Do you agree with the Emergency Medicine diagnosis of  "Acute pulmonary edema"?    [   X ] Yes   [   ] Yes, but it resolved prior to my assessment of the patient   [   ] No   [   ] Clinically Insignificant   [   ] Other/Clarification of Findings:   [  ] Clinically Undetermined         Please document in your progress notes daily for the duration of treatment until resolved and include in your discharge summary.                       "

## 2021-05-01 ENCOUNTER — HOSPITAL ENCOUNTER (EMERGENCY)
Facility: HOSPITAL | Age: 41
Discharge: HOME | End: 2021-05-01
Attending: EMERGENCY MEDICINE
Payer: MEDICAID

## 2021-05-01 VITALS
HEIGHT: 72 IN | RESPIRATION RATE: 18 BRPM | SYSTOLIC BLOOD PRESSURE: 154 MMHG | WEIGHT: 315 LBS | OXYGEN SATURATION: 98 % | HEART RATE: 78 BPM | TEMPERATURE: 99 F | BODY MASS INDEX: 42.66 KG/M2 | DIASTOLIC BLOOD PRESSURE: 68 MMHG

## 2021-05-01 DIAGNOSIS — R52 PAIN: ICD-10-CM

## 2021-05-01 DIAGNOSIS — M25.551 RIGHT HIP PAIN: ICD-10-CM

## 2021-05-01 DIAGNOSIS — M54.31 SCIATICA OF RIGHT SIDE: Primary | ICD-10-CM

## 2021-05-01 LAB
BILIRUB UR QL STRIP: NEGATIVE
CLARITY UR REFRACT.AUTO: CLEAR
COLOR UR AUTO: NORMAL
GLUCOSE UR QL STRIP: NEGATIVE
HGB UR QL STRIP: NEGATIVE
KETONES UR QL STRIP: NEGATIVE
LEUKOCYTE ESTERASE UR QL STRIP: NEGATIVE
NITRITE UR QL STRIP: NEGATIVE
PH UR STRIP: 5 [PH] (ref 5–8)
POCT GLUCOSE: 145 MG/DL (ref 70–110)
PROT UR QL STRIP: NEGATIVE
SP GR UR STRIP: 1.02 (ref 1–1.03)
URN SPEC COLLECT METH UR: NORMAL
UROBILINOGEN UR STRIP-ACNC: 1 EU/DL

## 2021-05-01 PROCEDURE — 99284 EMERGENCY DEPT VISIT MOD MDM: CPT | Mod: 25,ER

## 2021-05-01 PROCEDURE — 81003 URINALYSIS AUTO W/O SCOPE: CPT | Mod: ER | Performed by: EMERGENCY MEDICINE

## 2021-05-01 PROCEDURE — 63600175 PHARM REV CODE 636 W HCPCS: Mod: ER | Performed by: EMERGENCY MEDICINE

## 2021-05-01 PROCEDURE — 96372 THER/PROPH/DIAG INJ SC/IM: CPT | Mod: ER

## 2021-05-01 PROCEDURE — 25000003 PHARM REV CODE 250: Mod: ER | Performed by: EMERGENCY MEDICINE

## 2021-05-01 PROCEDURE — 82962 GLUCOSE BLOOD TEST: CPT | Mod: ER

## 2021-05-01 RX ORDER — DICLOFENAC SODIUM 25 MG/1
25 TABLET, DELAYED RELEASE ORAL 3 TIMES DAILY PRN
Qty: 9 TABLET | Refills: 0 | Status: SHIPPED | OUTPATIENT
Start: 2021-05-01 | End: 2021-05-04

## 2021-05-01 RX ORDER — DEXAMETHASONE SODIUM PHOSPHATE 4 MG/ML
8 INJECTION, SOLUTION INTRA-ARTICULAR; INTRALESIONAL; INTRAMUSCULAR; INTRAVENOUS; SOFT TISSUE
Status: COMPLETED | OUTPATIENT
Start: 2021-05-01 | End: 2021-05-01

## 2021-05-01 RX ORDER — KETOROLAC TROMETHAMINE 30 MG/ML
60 INJECTION, SOLUTION INTRAMUSCULAR; INTRAVENOUS
Status: COMPLETED | OUTPATIENT
Start: 2021-05-01 | End: 2021-05-01

## 2021-05-01 RX ORDER — OXYCODONE AND ACETAMINOPHEN 5; 325 MG/1; MG/1
2 TABLET ORAL
Status: COMPLETED | OUTPATIENT
Start: 2021-05-01 | End: 2021-05-01

## 2021-05-01 RX ADMIN — KETOROLAC TROMETHAMINE 60 MG: 30 INJECTION, SOLUTION INTRAMUSCULAR at 10:05

## 2021-05-01 RX ADMIN — OXYCODONE HYDROCHLORIDE AND ACETAMINOPHEN 2 TABLET: 5; 325 TABLET ORAL at 10:05

## 2021-05-01 RX ADMIN — DEXAMETHASONE SODIUM PHOSPHATE 8 MG: 4 INJECTION, SOLUTION INTRA-ARTICULAR; INTRALESIONAL; INTRAMUSCULAR; INTRAVENOUS; SOFT TISSUE at 10:05

## 2021-08-16 ENCOUNTER — HOSPITAL ENCOUNTER (OUTPATIENT)
Dept: RADIOLOGY | Facility: HOSPITAL | Age: 41
Discharge: HOME | End: 2021-08-16
Attending: NURSE PRACTITIONER
Payer: MEDICAID

## 2021-08-16 DIAGNOSIS — M54.50 LOW BACK PAIN: ICD-10-CM

## 2021-08-16 DIAGNOSIS — G62.9 NEUROPATHY: ICD-10-CM

## 2021-08-16 PROCEDURE — 72100 X-RAY EXAM L-S SPINE 2/3 VWS: CPT | Mod: TC,FY,PO

## 2022-08-22 ENCOUNTER — HOSPITAL ENCOUNTER (EMERGENCY)
Facility: HOSPITAL | Age: 42
Discharge: HOME | End: 2022-08-22
Attending: EMERGENCY MEDICINE
Payer: MEDICAID

## 2022-08-22 VITALS
HEART RATE: 80 BPM | DIASTOLIC BLOOD PRESSURE: 80 MMHG | OXYGEN SATURATION: 97 % | SYSTOLIC BLOOD PRESSURE: 144 MMHG | BODY MASS INDEX: 40.63 KG/M2 | RESPIRATION RATE: 18 BRPM | HEIGHT: 72 IN | WEIGHT: 300 LBS | TEMPERATURE: 99 F

## 2022-08-22 DIAGNOSIS — M53.3 SACROILIAC JOINT PAIN: Primary | ICD-10-CM

## 2022-08-22 LAB
BILIRUB UR QL STRIP: NEGATIVE
CLARITY UR REFRACT.AUTO: CLEAR
COLOR UR AUTO: YELLOW
GLUCOSE UR QL STRIP: NEGATIVE
HGB UR QL STRIP: NEGATIVE
KETONES UR QL STRIP: ABNORMAL
LEUKOCYTE ESTERASE UR QL STRIP: NEGATIVE
NITRITE UR QL STRIP: NEGATIVE
PH UR STRIP: 6 [PH] (ref 5–8)
PROT UR QL STRIP: NEGATIVE
SP GR UR STRIP: >=1.03 (ref 1–1.03)
URN SPEC COLLECT METH UR: ABNORMAL
UROBILINOGEN UR STRIP-ACNC: NEGATIVE EU/DL

## 2022-08-22 PROCEDURE — 63600175 PHARM REV CODE 636 W HCPCS: Mod: ER | Performed by: PHYSICIAN ASSISTANT

## 2022-08-22 PROCEDURE — 96372 THER/PROPH/DIAG INJ SC/IM: CPT | Performed by: PHYSICIAN ASSISTANT

## 2022-08-22 PROCEDURE — 81003 URINALYSIS AUTO W/O SCOPE: CPT | Mod: ER | Performed by: PHYSICIAN ASSISTANT

## 2022-08-22 PROCEDURE — 99284 EMERGENCY DEPT VISIT MOD MDM: CPT | Mod: 25,ER

## 2022-08-22 RX ORDER — TRIAMCINOLONE ACETONIDE 40 MG/ML
40 INJECTION, SUSPENSION INTRA-ARTICULAR; INTRAMUSCULAR
Status: COMPLETED | OUTPATIENT
Start: 2022-08-22 | End: 2022-08-22

## 2022-08-22 RX ORDER — METHOCARBAMOL 500 MG/1
1000 TABLET, FILM COATED ORAL 3 TIMES DAILY
Qty: 15 TABLET | Refills: 0 | Status: SHIPPED | OUTPATIENT
Start: 2022-08-22 | End: 2022-08-27

## 2022-08-22 RX ORDER — NAPROXEN 500 MG/1
500 TABLET ORAL 2 TIMES DAILY WITH MEALS
Qty: 20 TABLET | Refills: 0 | Status: SHIPPED | OUTPATIENT
Start: 2022-08-22 | End: 2024-03-18 | Stop reason: ALTCHOICE

## 2022-08-22 RX ADMIN — TRIAMCINOLONE ACETONIDE 40 MG: 40 INJECTION, SUSPENSION INTRA-ARTICULAR; INTRAMUSCULAR at 06:08

## 2022-08-22 NOTE — ED PROVIDER NOTES
Encounter Date: 8/22/2022       History     Chief Complaint   Patient presents with    Back Pain     C/o left lower back/flank pain that has been going on for about a month. Does not radiate anywhere but is worse with movement. No injury reported. Reports feeling a knot to area at times.      42-year-old obese male with history of hypertension presents ER for evaluation of left lower back pain ongoing for a few months.  Patient reports that the pain has been intermittent but more recently persistent.  Patient states he went on a 20 mi bike ride today which exacerbated his low back pain.  He reports worsening pain upon movement or certain positions.  Denies any associated paresthesia focal weakness extremity.  No bowel or bladder dysfunction.  No saddle anesthesia.  No prior back surgeries in the past.  He denies any trauma or fall otherwise.  No history of kidney stones or infection.  No abdominal pain, nausea vomiting or diarrhea.  No fevers or chills.    The history is provided by the patient.     Review of patient's allergies indicates:   Allergen Reactions    Iodine and iodide containing products Anaphylaxis     Past Medical History:   Diagnosis Date    Hypertension     Metatarsal fracture     10/14; left     No past surgical history on file.  Family History   Problem Relation Age of Onset    Diabetes Mother     Cancer Mother     No Known Problems Father      Social History     Tobacco Use    Smoking status: Never Smoker    Smokeless tobacco: Never Used   Substance Use Topics    Alcohol use: Yes     Alcohol/week: 0.0 standard drinks     Comment: occ    Drug use: No     Review of Systems   Constitutional: Negative for chills and fever.   HENT: Negative for congestion.    Eyes: Negative for visual disturbance.   Respiratory: Negative for shortness of breath.    Cardiovascular: Negative for chest pain.   Gastrointestinal: Negative for nausea and vomiting.   Genitourinary: Negative for dysuria and flank  pain.   Musculoskeletal: Positive for back pain. Negative for myalgias.   Skin: Negative for rash.   Allergic/Immunologic: Negative for immunocompromised state.   Neurological: Negative for weakness and numbness.   Hematological: Does not bruise/bleed easily.   Psychiatric/Behavioral: Negative for confusion.       Physical Exam     Initial Vitals [08/22/22 1636]   BP Pulse Resp Temp SpO2   (!) 156/93 101 20 98.9 °F (37.2 °C) 97 %      MAP       --         Physical Exam    Vitals reviewed.  Constitutional: He appears well-developed and well-nourished. He is not diaphoretic. No distress.   HENT:   Head: Normocephalic and atraumatic.   Eyes: Conjunctivae and EOM are normal.   Neck: Neck supple.   Pulmonary/Chest: No respiratory distress.   Musculoskeletal:         General: Normal range of motion.      Cervical back: Normal and neck supple.      Thoracic back: Normal.      Lumbar back: Tenderness (Left SI joint with contralateral straight leg raise pain) present.     Neurological: He is alert and oriented to person, place, and time.         ED Course   Procedures  Labs Reviewed   URINALYSIS, REFLEX TO URINE CULTURE - Abnormal; Notable for the following components:       Result Value    Specific Gravity, UA >=1.030 (*)     Ketones, UA Trace (*)     All other components within normal limits    Narrative:     Preferred Collection Type->Urine, Clean Catch  Specimen Source->Urine  Collection Type->Urine, Clean Catch          Imaging Results    None          Medications   triamcinolone acetonide injection 40 mg (has no administration in time range)           APC / Resident Notes:   Patient seen in the ER promptly upon arrival.  He is afebrile, no acute distress.  Physical examination consistent with SI joint dysfunction versus sciatica.  Patient does have some point tenderness to the SI joint as well.  Pain on straight leg raise.  He is ambulatory with a steady gait otherwise.  No CVA tenderness on exam.  Abdomen soft,  nondistended, nontender.    Urinalysis unremarkable.  Patient was given Kenalog in the ED. Will prescribed home on naproxen and Robaxin use as directed.  Will advised use heat pack to the region.  Patient instructed on SI joint dysfunction exercises and close follow-up with primary care physician for possible outpatient PT if symptoms persist.  Advised to reduce heavy lifting or strenuous activity.  He was given strict return precautions ED.  Stable for discharge and close follow-up at this time.    Disclaimer: This note has been generated using voice-recognition software. There may be typographical errors that have been missed during proof-reading.                   Clinical Impression:   Final diagnoses:  [M53.3] Sacroiliac joint pain (Primary)          ED Disposition Condition    Discharge Stable        ED Prescriptions     Medication Sig Dispense Start Date End Date Auth. Provider    naproxen (NAPROSYN) 500 MG tablet Take 1 tablet (500 mg total) by mouth 2 (two) times daily with meals. 20 tablet 8/22/2022  Dora Ceron PA-C    methocarbamoL (ROBAXIN) 500 MG Tab Take 2 tablets (1,000 mg total) by mouth 3 (three) times daily. for 5 days 15 tablet 8/22/2022 8/27/2022 Dora Ceron PA-C        Follow-up Information     Follow up With Specialties Details Why Contact Info    [facility]   [address] [place] [id number]  [phone]             Dora Ceron PA-C  08/22/22 4690

## 2022-08-22 NOTE — DISCHARGE INSTRUCTIONS
Heat pack to the region.  Avoid heavy lifting or strenuous activity.  Follow-up with your primary doctor.  You may need outpatient PT if symptoms persist or worsen.

## 2022-09-03 ENCOUNTER — HOSPITAL ENCOUNTER (EMERGENCY)
Facility: HOSPITAL | Age: 42
Discharge: HOME | End: 2022-09-03
Attending: EMERGENCY MEDICINE
Payer: MEDICAID

## 2022-09-03 VITALS
BODY MASS INDEX: 40.63 KG/M2 | RESPIRATION RATE: 20 BRPM | TEMPERATURE: 99 F | WEIGHT: 300 LBS | DIASTOLIC BLOOD PRESSURE: 93 MMHG | OXYGEN SATURATION: 99 % | HEIGHT: 72 IN | SYSTOLIC BLOOD PRESSURE: 151 MMHG | HEART RATE: 87 BPM

## 2022-09-03 DIAGNOSIS — L03.012 FELON OF FINGER OF LEFT HAND: Primary | ICD-10-CM

## 2022-09-03 DIAGNOSIS — L03.012 PARONYCHIA OF FINGER OF LEFT HAND: ICD-10-CM

## 2022-09-03 PROCEDURE — 25000003 PHARM REV CODE 250: Mod: ER | Performed by: PHYSICIAN ASSISTANT

## 2022-09-03 PROCEDURE — 87186 SC STD MICRODIL/AGAR DIL: CPT | Mod: ER | Performed by: PHYSICIAN ASSISTANT

## 2022-09-03 PROCEDURE — 26011 DRAINAGE OF FINGER ABSCESS: CPT | Mod: F2,ER

## 2022-09-03 PROCEDURE — 87075 CULTR BACTERIA EXCEPT BLOOD: CPT | Mod: ER | Performed by: PHYSICIAN ASSISTANT

## 2022-09-03 PROCEDURE — 87147 CULTURE TYPE IMMUNOLOGIC: CPT | Mod: ER | Performed by: PHYSICIAN ASSISTANT

## 2022-09-03 PROCEDURE — 87077 CULTURE AEROBIC IDENTIFY: CPT | Mod: ER | Performed by: PHYSICIAN ASSISTANT

## 2022-09-03 PROCEDURE — 87076 CULTURE ANAEROBE IDENT EACH: CPT | Mod: ER | Performed by: PHYSICIAN ASSISTANT

## 2022-09-03 PROCEDURE — 99284 EMERGENCY DEPT VISIT MOD MDM: CPT | Mod: 25,ER

## 2022-09-03 PROCEDURE — 87070 CULTURE OTHR SPECIMN AEROBIC: CPT | Mod: ER | Performed by: PHYSICIAN ASSISTANT

## 2022-09-03 RX ORDER — DOXYCYCLINE 100 MG/1
100 CAPSULE ORAL 2 TIMES DAILY
Qty: 20 CAPSULE | Refills: 0 | Status: SHIPPED | OUTPATIENT
Start: 2022-09-03 | End: 2022-09-13

## 2022-09-03 RX ORDER — HYDROCODONE BITARTRATE AND ACETAMINOPHEN 7.5; 325 MG/1; MG/1
1 TABLET ORAL EVERY 6 HOURS PRN
Qty: 12 TABLET | Refills: 0 | Status: SHIPPED | OUTPATIENT
Start: 2022-09-03

## 2022-09-03 RX ORDER — DOXYCYCLINE HYCLATE 100 MG
100 TABLET ORAL
Status: COMPLETED | OUTPATIENT
Start: 2022-09-03 | End: 2022-09-03

## 2022-09-03 RX ORDER — NAPROXEN 250 MG/1
500 TABLET ORAL
Status: COMPLETED | OUTPATIENT
Start: 2022-09-03 | End: 2022-09-03

## 2022-09-03 RX ORDER — OXYCODONE HYDROCHLORIDE 5 MG/1
15 TABLET ORAL
Status: COMPLETED | OUTPATIENT
Start: 2022-09-03 | End: 2022-09-03

## 2022-09-03 RX ORDER — LIDOCAINE HYDROCHLORIDE 10 MG/ML
5 INJECTION, SOLUTION EPIDURAL; INFILTRATION; INTRACAUDAL; PERINEURAL
Status: COMPLETED | OUTPATIENT
Start: 2022-09-03 | End: 2022-09-03

## 2022-09-03 RX ADMIN — LIDOCAINE HYDROCHLORIDE 50 MG: 10 INJECTION, SOLUTION EPIDURAL; INFILTRATION; INTRACAUDAL at 06:09

## 2022-09-03 RX ADMIN — DOXYCYCLINE HYCLATE 100 MG: 100 TABLET, COATED ORAL at 07:09

## 2022-09-03 RX ADMIN — OXYCODONE 15 MG: 5 TABLET ORAL at 06:09

## 2022-09-03 RX ADMIN — NAPROXEN 500 MG: 250 TABLET ORAL at 07:09

## 2022-09-03 NOTE — DISCHARGE INSTRUCTIONS
Hand surgery will call you to schedule clinic appointment, take antibiotics completely even if symptoms improve, use pain medicine- Add 600mg ibuprofen every 8 hours if needed for increased pain control.    Return to ER for fever, severe wound drainage or redness/swelling going up finger/hand

## 2022-09-03 NOTE — ED PROVIDER NOTES
"Encounter Date: 9/3/2022       History     Chief Complaint   Patient presents with    Hand Pain     Pt reports edema to 3rd digit on left hand, states PCP drained area but still swollen.      Patient is a 42-year-old male who presents for finger swelling and pain; PMH prediabetes, HTN, obesity.  Patient reports initial onset of "ingrown nail" to his 3rd left finger.  Went to his PCP several days ago where she attempted drainage.  She did not place patient on antibiotics.  Over the past few days, significant increase in swelling on dorsal and palmar aspect of 3rd finger.  Significant pain.  Denies fever, spontaneous drainage, joint pain.   The patients available PMH, PSH, Social History, medications, allergies, and triage vital signs were reviewed just prior to their medical evaluation.  A ten point review of systems was completed and is negative except as documented above.  Patient denies any other acute medical complaint.      Please be advised this text was dictated with Textingly software and may contain errors due to translation.         Review of patient's allergies indicates:   Allergen Reactions    Iodine and iodide containing products Anaphylaxis     Past Medical History:   Diagnosis Date    Hypertension     Metatarsal fracture     10/14; left     History reviewed. No pertinent surgical history.  Family History   Problem Relation Age of Onset    Diabetes Mother     Cancer Mother     No Known Problems Father      Social History     Tobacco Use    Smoking status: Never    Smokeless tobacco: Never   Substance Use Topics    Alcohol use: Yes     Alcohol/week: 0.0 standard drinks     Comment: occ    Drug use: No     Review of Systems   Constitutional:  Negative for chills and fever.   HENT:  Negative for sore throat.    Respiratory:  Negative for shortness of breath.    Cardiovascular:  Negative for chest pain.   Gastrointestinal:  Negative for nausea.   Genitourinary:  Negative for dysuria.   Musculoskeletal:  " Negative for back pain.   Skin:  Positive for color change and wound. Negative for rash.   Neurological:  Negative for weakness.   Hematological:  Does not bruise/bleed easily.     Physical Exam     Initial Vitals [09/03/22 1659]   BP Pulse Resp Temp SpO2   (!) 167/97 87 20 98.6 °F (37 °C) 97 %      MAP       --         Physical Exam    Nursing note and vitals reviewed.  Constitutional: He appears well-developed and well-nourished. He is not diaphoretic. No distress.   HENT:   Head: Normocephalic and atraumatic.   Right Ear: External ear normal.   Left Ear: External ear normal.   Mouth/Throat: Oropharynx is clear and moist.   Eyes: Conjunctivae and EOM are normal. Pupils are equal, round, and reactive to light. No scleral icterus.   Cardiovascular:  Normal rate, regular rhythm and intact distal pulses.           Pulmonary/Chest: Breath sounds normal. No respiratory distress. He has no wheezes. He has no rhonchi. He has no rales.   Abdominal: Abdomen is soft. Bowel sounds are normal. There is no abdominal tenderness. There is no rebound and no guarding.   Musculoskeletal:         General: Tenderness and edema present. Normal range of motion.      Comments: Third left digit:  Significant edema, tenderness and mild erythema to finger pad  Paronychia medial nail fold with edema  PROM of D IP, PIP intact without pain  Flexor/extensor tendons intact without pain     Neurological: He is alert and oriented to person, place, and time.   Skin: Skin is warm and dry. Capillary refill takes less than 2 seconds.   Psychiatric: He has a normal mood and affect. His behavior is normal. Judgment and thought content normal.       ED Course   I & D - Incision and Drainage    Date/Time: 9/3/2022 6:56 PM  Location procedure was performed: [facility] EMERGENCY DEPARTMENT  Performed by: Vilma Powell PA-C  Authorized by: Brooks Solorio MD   Pre-operative diagnosis: felon and paronychia  Post-operative diagnosis: felon and  paronychia  Consent Done: Yes  Consent: Verbal consent obtained.  Risks and benefits: risks, benefits and alternatives were discussed  Consent given by: patient  Patient identity confirmed: MRN  Type: abscess  Body area: upper extremity  Location details: left long finger  Anesthesia: digital block    Anesthesia:  Local Anesthetic: lidocaine 1% without epinephrine  Anesthetic total: 7 mL  Scalpel size: 11  Incision type: single straight  Complexity: complex  Drainage: pus, purulent and bloody  Drainage amount: copious  Wound treatment: incision, drainage, expression of material and wound left open  Complications: No  Estimated blood loss (mL): 1  Specimens: Yes  Implants: No  Patient tolerance: Patient tolerated the procedure well with no immediate complications      Labs Reviewed   CULTURE, AEROBIC  (SPECIFY SOURCE)   CULTURE, ANAEROBIC          Imaging Results    None          Medications   oxyCODONE immediate release tablet 15 mg (15 mg Oral Given 9/3/22 1804)   LIDOcaine (PF) 10 mg/ml (1%) injection 50 mg (50 mg Infiltration Given 9/3/22 1832)   LIDOcaine (PF) 10 mg/ml (1%) injection 50 mg (50 mg Infiltration Given 9/3/22 1830)   doxycycline tablet 100 mg (100 mg Oral Given 9/3/22 1919)   naproxen tablet 500 mg (500 mg Oral Given 9/3/22 1929)     Medical Decision Making:   History:   Old Medical Records: I decided to obtain old medical records.  Old Records Summarized: records from previous admission(s) and records from clinic visits.  Initial Assessment:   Paronychia and suspected felon of 3rd left digit. ROM intact no systemic symptoms. VSS, afebrile  Differential Diagnosis:   Paronychia, felon, cellulitis, abscess  Physical exam and history taking lower clinical suspicion for flexor tenosynovitis, septic arthritis, osteo, sepsis  Clinical Tests:   Lab Tests: Ordered  ED Management:  Patient tolerated I&D well without immediate complication. Wrapped in bulky splinting with nml cap refill s/p wrapping. First  doxycyline dose here.  Pain well controlled.    D/c hand referral f/u. Rx antibiotics, pain meds. Daily dressing changes and wound care instructions. Patient agreed to plan of care and voiced understanding.  Discharged in stable condition with strict ED return precautions.    Vilma Powell PA-C  2022    I discussed the following case, diagnosis and plan of care with attending physician.            Attending Attestation:     Physician Attestation Statement for NP/PA:   I have conducted a face to face encounter with this patient in addition to the NP/PA, due to Medical Complexity    Other NP/PA Attestation Additions:      Medical Decision Makin-year-old male with 1 week of finger pain.  Exam and bedside ultrasound consistent with paronychia and felon.  Agree with plan.                    Clinical Impression:   Final diagnoses:  [L03.012] Felon of finger of left hand (Primary)  [L03.012] Paronychia of finger of left hand      ED Disposition Condition    Discharge Stable          ED Prescriptions       Medication Sig Dispense Start Date End Date Auth. Provider    doxycycline (VIBRAMYCIN) 100 MG Cap Take 1 capsule (100 mg total) by mouth 2 (two) times daily. for 10 days 20 capsule 9/3/2022 2022 Vilma Powell PA-C    HYDROcodone-acetaminophen (NORCO) 7.5-325 mg per tablet Take 1 tablet by mouth every 6 (six) hours as needed for Pain. 12 tablet 9/3/2022 -- Vilma Powell PA-C          Follow-up Information    None          Vilma Powell PA-C  [id number]       Brooks Solorio MD  [id number]

## 2022-09-07 LAB
BACTERIA SPEC AEROBE CULT: ABNORMAL
BACTERIA SPEC AEROBE CULT: ABNORMAL

## 2022-09-08 ENCOUNTER — TELEPHONE (OUTPATIENT)
Dept: ORTHOPEDICS | Facility: CLINIC | Age: 42
End: 2022-09-08
Payer: MEDICAID

## 2022-09-08 NOTE — TELEPHONE ENCOUNTER
----- Message from Allison Ray sent at 9/8/2022  1:22 PM CDT -----  Good afternoon,    The patient has medicaid and have a referral from the emergency department with a diagnosis of Felon of finger of left hand/Paronychia of finger of left hand. Please assist with scheduling the patient?    Thank you

## 2022-09-09 DIAGNOSIS — M79.642 LEFT HAND PAIN: Primary | ICD-10-CM

## 2022-09-09 LAB — BACTERIA SPEC ANAEROBE CULT: ABNORMAL

## 2022-09-09 NOTE — PROGRESS NOTES
"Subjective:      Patient ID: Maxine Lemus is a 42 y.o. male.    Chief Complaint: Swelling of the Left Hand      HPI  (French)    Seen in ED on 9/3/22 with pain in left middle finger due to "ingrown nail." PCP attempted drainage prior to ED visit.     I&D of left middle finger done in ED on 9/3/22 and he was given doxycycline. He is here for follow up.     He is feeling much better. He has no current pain in left middle finger. He is right hand dominant. He rates his pain as a 0 on a scale of 1-10. No numbness or tingling.     He forgot to take his antibiotic this weekend. He is not taking norco.       Past Medical History:   Diagnosis Date    Hypertension     Metatarsal fracture     10/14; left         Current Outpatient Medications:     amLODIPine (NORVASC) 10 MG tablet, Take 1 tablet (10 mg total) by mouth once daily., Disp: 90 tablet, Rfl: 3    lisinopril-hydrochlorothiazide (PRINZIDE,ZESTORETIC) 20-25 mg Tab, Take 1 tablet by mouth once daily., Disp: 90 tablet, Rfl: 4    doxycycline (VIBRAMYCIN) 100 MG Cap, Take 1 capsule (100 mg total) by mouth 2 (two) times daily. for 10 days (Patient not taking: Reported on 9/12/2022), Disp: 20 capsule, Rfl: 0    HYDROcodone-acetaminophen (NORCO) 7.5-325 mg per tablet, Take 1 tablet by mouth every 6 (six) hours as needed for Pain. (Patient not taking: Reported on 9/12/2022), Disp: 12 tablet, Rfl: 0    metFORMIN (GLUCOPHAGE) 500 MG tablet, Take 1 tablet (500 mg total) by mouth 2 (two) times daily with meals. (Patient not taking: Reported on 9/12/2022), Disp: 180 tablet, Rfl: 3    naproxen (NAPROSYN) 500 MG tablet, Take 1 tablet (500 mg total) by mouth 2 (two) times daily with meals. (Patient not taking: Reported on 9/12/2022), Disp: 20 tablet, Rfl: 0    Review of patient's allergies indicates:   Allergen Reactions    Iodine and iodide containing products Anaphylaxis       Review of Systems   Constitutional: Negative for chills, fever, night sweats and weight gain. "   Gastrointestinal:  Negative for bowel incontinence, nausea and vomiting.   Genitourinary:  Negative for bladder incontinence.   Neurological:  Negative for disturbances in coordination and loss of balance.         Objective:        Wt (!) 144.1 kg (317 lb 11.2 oz)   BMI 43.09 kg/m²     General    Vitals reviewed.  Constitutional: He is oriented to person, place, and time. He appears well-developed and well-nourished.   Pulmonary/Chest: Effort normal.   Abdominal: He exhibits no distension.   Neurological: He is alert and oriented to person, place, and time.   Psychiatric: He has a normal mood and affect. His behavior is normal. Judgment and thought content normal.         LEFT Hand/Wrist Examination:  Left middle finger shows minimal swelling and redness pulp of finger. No tenderness noted.     He has full ROM of the finger with no pain.     He is NVI distally with good capillary refill.               XRAY INTERPRETATION:   X-rays of left hand dated 9/12/22 are personally reviewed and show no fracture or dislocation.          Assessment:       Encounter Diagnosis   Name Primary?    Felon of finger of left hand           Plan:       Maxine was seen today for swelling.    Diagnoses and all orders for this visit:    Felon of finger of left hand  -     Ambulatory referral/consult to Hand Surgery    Pain/swelling in left middle finger for a few weeks- seen in ED on 9/3/22 and had I&D done.     Now with no pain to left middle finger. No numbness or tingling. He is right hand dominant.     XRs of left hand look good.     Treatment options reviewed with patient along with above left hand xrays. Following plan made:     - Restart doxycycline and finish out prescription.   - Activity as tolerated with left hand.   - Call or go to ED if finger gets worse.   - Follow up in 2 weeks for recheck.     Above plan reviewed with Dr. Kimbrough and he agrees.     Follow up in about 2 weeks (around 9/26/2022).

## 2022-09-12 ENCOUNTER — OFFICE VISIT (OUTPATIENT)
Dept: ORTHOPEDICS | Facility: CLINIC | Age: 42
End: 2022-09-12
Payer: MEDICAID

## 2022-09-12 VITALS — BODY MASS INDEX: 43.09 KG/M2 | WEIGHT: 315 LBS

## 2022-09-12 DIAGNOSIS — L03.012 FELON OF FINGER OF LEFT HAND: ICD-10-CM

## 2022-09-12 PROCEDURE — 4010F PR ACE/ARB THEARPY RXD/TAKEN: ICD-10-PCS | Mod: CPTII,,, | Performed by: PHYSICIAN ASSISTANT

## 2022-09-12 PROCEDURE — 99213 OFFICE O/P EST LOW 20 MIN: CPT | Mod: PBBFAC,PN | Performed by: PHYSICIAN ASSISTANT

## 2022-09-12 PROCEDURE — 99203 PR OFFICE/OUTPT VISIT, NEW, LEVL III, 30-44 MIN: ICD-10-PCS | Mod: S$PBB,,, | Performed by: PHYSICIAN ASSISTANT

## 2022-09-12 PROCEDURE — 99999 PR PBB SHADOW E&M-EST. PATIENT-LVL III: CPT | Mod: PBBFAC,,, | Performed by: PHYSICIAN ASSISTANT

## 2022-09-12 PROCEDURE — 1160F PR REVIEW ALL MEDS BY PRESCRIBER/CLIN PHARMACIST DOCUMENTED: ICD-10-PCS | Mod: CPTII,,, | Performed by: PHYSICIAN ASSISTANT

## 2022-09-12 PROCEDURE — 4010F ACE/ARB THERAPY RXD/TAKEN: CPT | Mod: CPTII,,, | Performed by: PHYSICIAN ASSISTANT

## 2022-09-12 PROCEDURE — 3008F PR BODY MASS INDEX (BMI) DOCUMENTED: ICD-10-PCS | Mod: CPTII,,, | Performed by: PHYSICIAN ASSISTANT

## 2022-09-12 PROCEDURE — 99999 PR PBB SHADOW E&M-EST. PATIENT-LVL III: ICD-10-PCS | Mod: PBBFAC,,, | Performed by: PHYSICIAN ASSISTANT

## 2022-09-12 PROCEDURE — 1160F RVW MEDS BY RX/DR IN RCRD: CPT | Mod: CPTII,,, | Performed by: PHYSICIAN ASSISTANT

## 2022-09-12 PROCEDURE — 1159F PR MEDICATION LIST DOCUMENTED IN MEDICAL RECORD: ICD-10-PCS | Mod: CPTII,,, | Performed by: PHYSICIAN ASSISTANT

## 2022-09-12 PROCEDURE — 99203 OFFICE O/P NEW LOW 30 MIN: CPT | Mod: S$PBB,,, | Performed by: PHYSICIAN ASSISTANT

## 2022-09-12 PROCEDURE — 1159F MED LIST DOCD IN RCRD: CPT | Mod: CPTII,,, | Performed by: PHYSICIAN ASSISTANT

## 2022-09-12 PROCEDURE — 3008F BODY MASS INDEX DOCD: CPT | Mod: CPTII,,, | Performed by: PHYSICIAN ASSISTANT

## 2022-09-22 NOTE — PATIENT INSTRUCTIONS
It was nice to meet you today!     I am glad you are feeling better.    Your xrays looked normal.     Please restart the doxycyline (antibiotic) from emergency room) and take it until gone.     You can resume normal activity with the hand.     If the finger gets worse, call me or go back to emergency room.     I will see you back in 2 weeks, but please stay in touch and call me if you need anything. You can also send me a message in [facility].     Sherice   [phone]   
Yes

## 2023-03-24 ENCOUNTER — HOSPITAL ENCOUNTER (EMERGENCY)
Facility: HOSPITAL | Age: 43
Discharge: HOME | End: 2023-03-24
Attending: EMERGENCY MEDICINE
Payer: MEDICAID

## 2023-03-24 VITALS
WEIGHT: 300 LBS | BODY MASS INDEX: 40.63 KG/M2 | SYSTOLIC BLOOD PRESSURE: 160 MMHG | DIASTOLIC BLOOD PRESSURE: 89 MMHG | HEIGHT: 72 IN | HEART RATE: 75 BPM | RESPIRATION RATE: 18 BRPM | TEMPERATURE: 98 F | OXYGEN SATURATION: 98 %

## 2023-03-24 DIAGNOSIS — R06.02 SOB (SHORTNESS OF BREATH): ICD-10-CM

## 2023-03-24 LAB
ANION GAP SERPL CALC-SCNC: 8 MMOL/L (ref 8–16)
BASOPHILS # BLD AUTO: 0.04 K/UL (ref 0–0.2)
BASOPHILS NFR BLD: 0.4 % (ref 0–1.9)
CALCIUM SERPL-MCNC: 9.4 MG/DL (ref 8.7–10.5)
CHLORIDE SERPL-SCNC: 102 MMOL/L (ref 95–110)
CO2 SERPL-SCNC: 25 MMOL/L (ref 23–29)
CREAT SERPL-MCNC: 0.83 MG/DL (ref 0.5–1.4)
CTP QC/QA: YES
D DIMER PPP IA.FEU-MCNC: <0.19 MG/L FEU
DIFFERENTIAL METHOD: ABNORMAL
EOSINOPHIL # BLD AUTO: 0.3 K/UL (ref 0–0.5)
EOSINOPHIL NFR BLD: 2.8 % (ref 0–8)
ERYTHROCYTE [DISTWIDTH] IN BLOOD BY AUTOMATED COUNT: 13.3 % (ref 11.5–14.5)
EST. GFR  (NO RACE VARIABLE): >60 ML/MIN/1.73 M^2
GLUCOSE SERPL-MCNC: 113 MG/DL (ref 70–110)
HCT VFR BLD AUTO: 43.8 % (ref 40–54)
HGB BLD-MCNC: 13.8 G/DL (ref 14–18)
IMM GRANULOCYTES # BLD AUTO: 0.05 K/UL (ref 0–0.04)
IMM GRANULOCYTES NFR BLD AUTO: 0.6 % (ref 0–0.5)
LYMPHOCYTES # BLD AUTO: 2.8 K/UL (ref 1–4.8)
LYMPHOCYTES NFR BLD: 30.9 % (ref 18–48)
MCH RBC QN AUTO: 24 PG (ref 27–31)
MCHC RBC AUTO-ENTMCNC: 31.5 G/DL (ref 32–36)
MCV RBC AUTO: 76 FL (ref 82–98)
MONOCYTES # BLD AUTO: 1.1 K/UL (ref 0.3–1)
MONOCYTES NFR BLD: 12.2 % (ref 4–15)
NEUTROPHILS # BLD AUTO: 4.7 K/UL (ref 1.8–7.7)
NEUTROPHILS NFR BLD: 53.1 % (ref 38–73)
NRBC BLD-RTO: 0 /100 WBC
NT-PROBNP SERPL-MCNC: <11 PG/ML (ref 5–450)
PLATELET # BLD AUTO: 289 K/UL (ref 150–450)
PMV BLD AUTO: 11.6 FL (ref 9.2–12.9)
POTASSIUM SERPL-SCNC: 4 MMOL/L (ref 3.5–5.1)
RBC # BLD AUTO: 5.75 M/UL (ref 4.6–6.2)
SARS-COV-2 RDRP RESP QL NAA+PROBE: NEGATIVE
SODIUM SERPL-SCNC: 135 MMOL/L (ref 136–145)
TROPONIN I SERPL-MCNC: <0.012 NG/ML (ref 0.01–0.03)
UUN UR-MCNC: 13 MG/DL (ref 2–20)
WBC # BLD AUTO: 8.93 K/UL (ref 3.9–12.7)

## 2023-03-24 PROCEDURE — 85025 COMPLETE CBC W/AUTO DIFF WBC: CPT | Mod: ER | Performed by: EMERGENCY MEDICINE

## 2023-03-24 PROCEDURE — 80048 BASIC METABOLIC PNL TOTAL CA: CPT | Mod: ER | Performed by: EMERGENCY MEDICINE

## 2023-03-24 PROCEDURE — 84484 ASSAY OF TROPONIN QUANT: CPT | Mod: ER | Performed by: EMERGENCY MEDICINE

## 2023-03-24 PROCEDURE — 83880 ASSAY OF NATRIURETIC PEPTIDE: CPT | Mod: ER | Performed by: EMERGENCY MEDICINE

## 2023-03-24 PROCEDURE — 99284 EMERGENCY DEPT VISIT MOD MDM: CPT | Mod: 25,ER

## 2023-03-24 PROCEDURE — 85379 FIBRIN DEGRADATION QUANT: CPT | Mod: ER | Performed by: EMERGENCY MEDICINE

## 2023-03-24 RX ORDER — ALBUTEROL SULFATE 90 UG/1
1-2 AEROSOL, METERED RESPIRATORY (INHALATION) EVERY 6 HOURS PRN
Qty: 6.7 G | Refills: 0 | Status: SHIPPED | OUTPATIENT
Start: 2023-03-24 | End: 2024-03-23

## 2023-03-25 NOTE — ED PROVIDER NOTES
Chief Complaint: shortness of breath     History of Present Illness:    Maxine Lemus 43 y.o. with a  has a past medical history of Hypertension and Metatarsal fracture. who presents to the emergency department today with a complaint of shortness of breath that has been ongoing for the past week.  He has SOB with exertion and has progressed to feeling SOB at rest.  He has orthopnea.   He has had cough. No sputum production. No hemoptysis, no leg swelling, no chest pain, no palpitations. No smoking history.         ROS    Constitutional: No fever, no chills.  ENT: No nasal drainage. No ear ache. No sore throat.  Gastrointestinal: No abdominal pain, no vomiting. No diarrhea.  Genitourinary: No hematuria, dysuria, urgency.  Musculoskeletal: No back pain.   Neurological: No headache, no focal weakness.    Otherwise remaining ROS negative     The history is provided by the patient      Reviewed and verified by myself:   PMH/PSH/SOC/FH REVIEWED :    Past Medical History:   Diagnosis Date    Hypertension     Metatarsal fracture     10/14; left       History reviewed. No pertinent surgical history.    Social History     Socioeconomic History    Marital status:     Years of education: 12   Tobacco Use    Smoking status: Never    Smokeless tobacco: Never   Substance and Sexual Activity    Alcohol use: Yes     Alcohol/week: 0.0 standard drinks     Comment: occ    Drug use: No    Sexual activity: Yes     Partners: Female       Family History   Problem Relation Age of Onset    Diabetes Mother     Cancer Mother     No Known Problems Father                ALLERGIES REVIEWED  Review of patient's allergies indicates:   Allergen Reactions    Iodine and iodide containing products Anaphylaxis       MEDICATIONS REVIEWED             Current Medications    AMLODIPINE (NORVASC) 10 MG TABLET    Take 1 tablet (10 mg total) by mouth once daily.    HYDROCODONE-ACETAMINOPHEN (NORCO) 7.5-325 MG PER TABLET    Take 1 tablet by  mouth every 6 (six) hours as needed for Pain.    LISINOPRIL-HYDROCHLOROTHIAZIDE (PRINZIDE,ZESTORETIC) 20-25 MG TAB    Take 1 tablet by mouth once daily.    METFORMIN (GLUCOPHAGE) 500 MG TABLET    Take 1 tablet (500 mg total) by mouth 2 (two) times daily with meals.    NAPROXEN (NAPROSYN) 500 MG TABLET    Take 1 tablet (500 mg total) by mouth 2 (two) times daily with meals.           VS reviewed    Nursing/Ancillary staff note reviewed.       Physical Exam     ED Triage Vitals [03/24/23 1853]   BP (!) 160/89   Pulse 75   Resp 18   Temp 97.9 °F (36.6 °C)   SpO2 98 %       Physical Exam    Constitutional: The patient is alert, has no immediate need for airway protection and no signs of toxicity. No acute distress. Oxygenating appropriately on room air.  Body mass index is 40.69 kg/m².    Eyes: Pupils equal and round no pallor or injection. Extra ocular movements intact. No drainage.   ENT: Mucous membranes are moist. Oropharynx clear.   Neck: Neck is supple non-tender. No lymphadenopathy. No stridor.   Respiratory: There are no retractions, lungs are clear to auscultation. No wheezing, no crackles.   Cardiovascular: Regular rate and rhythm. No murmurs, rubs or gallops.  Chest/Breast: Chest wall nontender to palpation.   Gastrointestinal:  Abdomen is soft and non-tender, no masses, bowel sounds normal. No guarding, no rebound.  No pulsatile mass.   Neurological: Alert and oriented x 4. CN II-XII grossly intact. No focal weakness. Strength intact 5/5 bilaterally in upper and lower extremities.   Skin: Warm and dry, no rashes.  Musculoskeletal: Extremities are non-tender, non-swollen and have full range of motion. Back NTTP along the midline.   Psyc: Normal behavior. Linear thought content.          ED Course         ED Course as of 03/25/23 0539   Fri Mar 24, 2023   2000 Discussed the results of the workup with the pt. He is relieved. Reports feeling improvedPatient verbalized understanding. Any concerns and/or questions  were addressed..  [JA]   2017 D-Dimer: <0.19  Normal, PE ruled out by Wells Criteria.  [JA]   2017 Troponin I: <0.012  Normal [JA]   2017 CO2: 25  Normal.  [JA]   2018 CBC normal.  [JA]      ED Course User Index  [JA] Roslyn Santoyo MD            ED Management:    Differential diagnosis included but not limited to : pulmonary infectious process, COPD, asthma, pulmonary embolus and congestive heart failure.        Medical Decision Making:   Initial: This is a 43 y.o. male  with  has a past medical history of Hypertension and Metatarsal fracture. who comes in complaining of shortness of breath for a week. On examination vitals are stable. O2 sats appropriate. On physical exam lungs are clear. No leg swelling. He has a regular rate and rhythm. His exam is normal otherwise. Concerns at this time high for PE, CHF, infectious etiology to his symptoms.     Orders to further evaluate included:  EKG, CBC, BMP, troponin, pro BNP, COVID swab, chest x-ray.    Comorbidity impacting this encounter includes: HTN, obesity       ED Course as of 03/25/23 0539   Fri Mar 24, 2023   2000 Discussed the results of the workup with the pt. He is relieved. Reports feeling improvedPatient verbalized understanding. Any concerns and/or questions were addressed..  [JA]   2017 D-Dimer: <0.19  Normal, PE ruled out by Wells Criteria.  [JA]   2017 Troponin I: <0.012  Normal [JA]   2017 CO2: 25  Normal.  [JA]   2018 CBC normal.  [JA]      ED Course User Index  [JA] Roslyn Santoyo MD           Medical Decision Making  Problems Addressed:  SOB (shortness of breath): acute illness or injury with systemic symptoms    Amount and/or Complexity of Data Reviewed  External Data Reviewed: notes.     Details: Seen 9/12/2022 by Orthopedics Sherice Mckinney PA-C for Felon of finger of left hand  Labs:  Decision-making details documented in ED Course.  Radiology: ordered and independent interpretation performed.     Details: I independently viewed the chest  x-ray images with findings of no infiltrate or effusion.    Risk  OTC drugs.  Prescription drug management.  After consideration of the pts current medications, the decision is made to maintain the current medication regimen.  OTC products such as tylenol or ibuprofen discussed for supplemental symptom control.   Discharge Medication List as of 3/24/2023  9:14 PM        START taking these medications    Details   albuterol (PROVENTIL/VENTOLIN HFA) 90 mcg/actuation inhaler Inhale 1-2 puffs into the lungs every 6 (six) hours as needed for Shortness of Breath. Rescue, Starting Fri 3/24/2023, Until Sat 3/23/2024 at 2359, Normal                 MDM continued: This is a 44 YO M who presents to the ED today with shortness of breath.  His PE and workup is reassuring. He has clear lung sounds and is oxygenating appropriately on room air.   D-dimer normal, ruling out PE, troponin normal, pro BNP, CXR without fluid overload I doubt CHF, not consistent with ACS,. CXR without signs of pneumonia.  He has had cough so likely has a viral etiology that will need to run its course, COVID Neg.  No need for ABX at this time as no sign of bacterial infection. I have discussed this all with him and he is reassured.  I will write him for albuterol inhaler and have him followup with his PCP.  He is comfortable with this plan.  Discussed taking meds as prescribed at home.       The pt is comfortable with this plan and comfortable going home at this time. After taking into careful account the historical factors and physical exam findings of the patient's presentation today, in conjunction with the empirical and objective data obtained on ED workup, no acute emergent medical condition requiring admission has been identified. The patient appears to be low risk for an emergent medical condition and I feel it is safe and appropriate at this time for the patient to be discharged to follow-up as detailed in their discharge instructions for  reevaluation and possible continued outpatient workup and management. Regardless, an unremarkable evaluation in the ED does not preclude the development or presence of a serious or life threatening condition. As such, patient was instructed to return immediately for any worsening or change in current symptoms. Precautions for return discussed at length.  Discharge and follow-up instructions discussed with the patient who expressed understanding and willingness to comply with my recommendations.    Voice recognition software utilized in this note.              Impression      The encounter diagnosis was SOB (shortness of breath).                Discharge Medication List as of 3/24/2023  9:14 PM        START taking these medications    Details   albuterol (PROVENTIL/VENTOLIN HFA) 90 mcg/actuation inhaler Inhale 1-2 puffs into the lungs every 6 (six) hours as needed for Shortness of Breath. Rescue, Starting Fri 3/24/2023, Until Sat 3/23/2024 at 2359, Normal              Follow-up Information       Schedule an appointment as soon as possible for a visit  with EUGENIA Thomson.    Specialty: Family Medicine  Contact information:  [address] [place] [address] [id number] [phone]               [facility] - Emergency Dept.    Specialty: Emergency Medicine  Why: If symptoms worsen  Contact information:  [address] [facility] [address] [phone]                                      Roslyn Santoyo MD  03/25/23 0146

## 2023-03-25 NOTE — DISCHARGE INSTRUCTIONS
Additional instructions  Followup with your primary care physician in 2-3 days if you are not improving. Take all your medications as prescribed. Return to the emergency department if you have increasing pain, chest pain, difficulty breathing,  nonstop vomiting, or any other concerns. Be sure to drink plenty of fluids to stay hydrated. Get plenty of rest. Please refer to additional educational material for further instructions.

## 2023-06-25 ENCOUNTER — HOSPITAL ENCOUNTER (EMERGENCY)
Facility: HOSPITAL | Age: 43
Discharge: HOME | End: 2023-06-26
Attending: EMERGENCY MEDICINE
Payer: MEDICAID

## 2023-06-25 DIAGNOSIS — R00.2 PALPITATIONS: ICD-10-CM

## 2023-06-25 DIAGNOSIS — R07.9 CHEST PAIN: ICD-10-CM

## 2023-06-25 DIAGNOSIS — R06.02 SOB (SHORTNESS OF BREATH): Primary | ICD-10-CM

## 2023-06-25 PROCEDURE — 99285 EMERGENCY DEPT VISIT HI MDM: CPT | Mod: 25,ER

## 2023-06-25 PROCEDURE — 84439 ASSAY OF FREE THYROXINE: CPT | Performed by: EMERGENCY MEDICINE

## 2023-06-25 PROCEDURE — 81003 URINALYSIS AUTO W/O SCOPE: CPT | Mod: ER | Performed by: EMERGENCY MEDICINE

## 2023-06-25 PROCEDURE — 85379 FIBRIN DEGRADATION QUANT: CPT | Mod: ER | Performed by: EMERGENCY MEDICINE

## 2023-06-25 PROCEDURE — 93010 ELECTROCARDIOGRAM REPORT: CPT | Mod: ,,, | Performed by: INTERNAL MEDICINE

## 2023-06-25 PROCEDURE — 93010 EKG 12-LEAD: ICD-10-PCS | Mod: ,,, | Performed by: INTERNAL MEDICINE

## 2023-06-25 PROCEDURE — 80053 COMPREHEN METABOLIC PANEL: CPT | Mod: ER | Performed by: EMERGENCY MEDICINE

## 2023-06-25 PROCEDURE — U0002 COVID-19 LAB TEST NON-CDC: HCPCS | Mod: ER | Performed by: EMERGENCY MEDICINE

## 2023-06-25 PROCEDURE — 84484 ASSAY OF TROPONIN QUANT: CPT | Mod: ER | Performed by: EMERGENCY MEDICINE

## 2023-06-25 PROCEDURE — 84443 ASSAY THYROID STIM HORMONE: CPT | Mod: ER | Performed by: EMERGENCY MEDICINE

## 2023-06-25 PROCEDURE — 85025 COMPLETE CBC W/AUTO DIFF WBC: CPT | Mod: ER | Performed by: EMERGENCY MEDICINE

## 2023-06-25 PROCEDURE — 93005 ELECTROCARDIOGRAM TRACING: CPT | Mod: ER

## 2023-06-25 PROCEDURE — 94760 N-INVAS EAR/PLS OXIMETRY 1: CPT | Mod: ER

## 2023-06-26 VITALS
OXYGEN SATURATION: 95 % | WEIGHT: 300 LBS | HEART RATE: 98 BPM | TEMPERATURE: 99 F | SYSTOLIC BLOOD PRESSURE: 135 MMHG | DIASTOLIC BLOOD PRESSURE: 61 MMHG | BODY MASS INDEX: 40.63 KG/M2 | RESPIRATION RATE: 20 BRPM | HEIGHT: 72 IN

## 2023-06-26 LAB
ALBUMIN SERPL BCP-MCNC: 4.2 G/DL (ref 3.5–5.2)
ALP SERPL-CCNC: 74 U/L (ref 38–126)
ALT SERPL W/O P-5'-P-CCNC: 35 U/L (ref 10–44)
ANION GAP SERPL CALC-SCNC: 10 MMOL/L (ref 8–16)
AST SERPL-CCNC: 29 U/L (ref 15–46)
BASOPHILS # BLD AUTO: 0.06 K/UL (ref 0–0.2)
BASOPHILS NFR BLD: 0.6 % (ref 0–1.9)
BILIRUB SERPL-MCNC: 0.3 MG/DL (ref 0.1–1)
BILIRUB UR QL STRIP: NEGATIVE
CALCIUM SERPL-MCNC: 9.3 MG/DL (ref 8.7–10.5)
CHLORIDE SERPL-SCNC: 102 MMOL/L (ref 95–110)
CLARITY UR REFRACT.AUTO: CLEAR
CO2 SERPL-SCNC: 27 MMOL/L (ref 23–29)
COLOR UR AUTO: YELLOW
CREAT SERPL-MCNC: 1.11 MG/DL (ref 0.5–1.4)
D DIMER PPP IA.FEU-MCNC: <0.19 MG/L FEU
DIFFERENTIAL METHOD: ABNORMAL
EOSINOPHIL # BLD AUTO: 0.2 K/UL (ref 0–0.5)
EOSINOPHIL NFR BLD: 2.2 % (ref 0–8)
ERYTHROCYTE [DISTWIDTH] IN BLOOD BY AUTOMATED COUNT: 14 % (ref 11.5–14.5)
EST. GFR  (NO RACE VARIABLE): >60 ML/MIN/1.73 M^2
GLUCOSE SERPL-MCNC: 130 MG/DL (ref 70–110)
GLUCOSE UR QL STRIP: NEGATIVE
HCT VFR BLD AUTO: 40.6 % (ref 40–54)
HGB BLD-MCNC: 13.1 G/DL (ref 14–18)
HGB UR QL STRIP: NEGATIVE
IMM GRANULOCYTES # BLD AUTO: 0.04 K/UL (ref 0–0.04)
IMM GRANULOCYTES NFR BLD AUTO: 0.4 % (ref 0–0.5)
KETONES UR QL STRIP: NEGATIVE
LEUKOCYTE ESTERASE UR QL STRIP: NEGATIVE
LYMPHOCYTES # BLD AUTO: 3.1 K/UL (ref 1–4.8)
LYMPHOCYTES NFR BLD: 28.5 % (ref 18–48)
MCH RBC QN AUTO: 24.4 PG (ref 27–31)
MCHC RBC AUTO-ENTMCNC: 32.3 G/DL (ref 32–36)
MCV RBC AUTO: 76 FL (ref 82–98)
MONOCYTES # BLD AUTO: 1 K/UL (ref 0.3–1)
MONOCYTES NFR BLD: 9.5 % (ref 4–15)
NEUTROPHILS # BLD AUTO: 6.4 K/UL (ref 1.8–7.7)
NEUTROPHILS NFR BLD: 58.8 % (ref 38–73)
NITRITE UR QL STRIP: NEGATIVE
NRBC BLD-RTO: 0 /100 WBC
PH UR STRIP: 6 [PH] (ref 5–8)
PLATELET # BLD AUTO: 243 K/UL (ref 150–450)
PMV BLD AUTO: 11.9 FL (ref 9.2–12.9)
POTASSIUM SERPL-SCNC: 3.8 MMOL/L (ref 3.5–5.1)
PROT SERPL-MCNC: 7.7 G/DL (ref 6–8.4)
PROT UR QL STRIP: NEGATIVE
RBC # BLD AUTO: 5.36 M/UL (ref 4.6–6.2)
SARS-COV-2 RDRP RESP QL NAA+PROBE: NEGATIVE
SODIUM SERPL-SCNC: 139 MMOL/L (ref 136–145)
SP GR UR STRIP: 1.02 (ref 1–1.03)
T4 FREE SERPL-MCNC: 0.92 NG/DL (ref 0.71–1.51)
TROPONIN I SERPL-MCNC: <0.012 NG/ML (ref 0.01–0.03)
TSH SERPL DL<=0.005 MIU/L-ACNC: 4.17 UIU/ML (ref 0.4–4)
URN SPEC COLLECT METH UR: NORMAL
UROBILINOGEN UR STRIP-ACNC: 1 EU/DL
UUN UR-MCNC: 15 MG/DL (ref 2–20)
WBC # BLD AUTO: 10.79 K/UL (ref 3.9–12.7)

## 2023-06-26 PROCEDURE — 63600175 PHARM REV CODE 636 W HCPCS: Mod: ER | Performed by: EMERGENCY MEDICINE

## 2023-06-26 PROCEDURE — 94640 AIRWAY INHALATION TREATMENT: CPT | Mod: ER

## 2023-06-26 PROCEDURE — 94760 N-INVAS EAR/PLS OXIMETRY 1: CPT | Mod: ER

## 2023-06-26 PROCEDURE — 25000242 PHARM REV CODE 250 ALT 637 W/ HCPCS: Mod: ER | Performed by: EMERGENCY MEDICINE

## 2023-06-26 PROCEDURE — 27000221 HC OXYGEN, UP TO 24 HOURS: Mod: ER

## 2023-06-26 RX ORDER — PREDNISONE 20 MG/1
60 TABLET ORAL DAILY
Qty: 21 TABLET | Refills: 0 | Status: SHIPPED | OUTPATIENT
Start: 2023-06-26 | End: 2023-07-03

## 2023-06-26 RX ORDER — ALBUTEROL SULFATE 90 UG/1
1-2 AEROSOL, METERED RESPIRATORY (INHALATION) EVERY 6 HOURS PRN
Qty: 18 G | Refills: 0 | Status: SHIPPED | OUTPATIENT
Start: 2023-06-26 | End: 2024-06-25

## 2023-06-26 RX ORDER — BENZONATATE 200 MG/1
200 CAPSULE ORAL 3 TIMES DAILY PRN
Qty: 20 CAPSULE | Refills: 0 | Status: SHIPPED | OUTPATIENT
Start: 2023-06-26 | End: 2023-07-06

## 2023-06-26 RX ORDER — PREDNISONE 20 MG/1
60 TABLET ORAL
Status: COMPLETED | OUTPATIENT
Start: 2023-06-26 | End: 2023-06-26

## 2023-06-26 RX ORDER — ALBUTEROL SULFATE 2.5 MG/.5ML
2.5 SOLUTION RESPIRATORY (INHALATION)
Status: COMPLETED | OUTPATIENT
Start: 2023-06-26 | End: 2023-06-26

## 2023-06-26 RX ADMIN — ALBUTEROL SULFATE 2.5 MG: 2.5 SOLUTION RESPIRATORY (INHALATION) at 12:06

## 2023-06-26 RX ADMIN — ALBUTEROL SULFATE 2.5 MG: 2.5 SOLUTION RESPIRATORY (INHALATION) at 01:06

## 2023-06-26 RX ADMIN — PREDNISONE 60 MG: 20 TABLET ORAL at 12:06

## 2023-06-26 NOTE — ED PROVIDER NOTES
Encounter Date: 6/25/2023       History     Chief Complaint   Patient presents with    Shortness of Breath     Reports to ED c c/o SOB that began earlier today. +Palpitations. Denies fever or N/V.      HPI  43 y.o.   Feeling sob intermittently today  Palpitations intermittent x 1 week  Some cough    Seen for similar 3/23    Review of patient's allergies indicates:   Allergen Reactions    Iodine and iodide containing products Anaphylaxis     Past Medical History:   Diagnosis Date    Hypertension     Metatarsal fracture     10/14; left     History reviewed. No pertinent surgical history.  Family History   Problem Relation Age of Onset    Diabetes Mother     Cancer Mother     No Known Problems Father      Social History     Tobacco Use    Smoking status: Never    Smokeless tobacco: Never   Substance Use Topics    Alcohol use: Yes     Alcohol/week: 0.0 standard drinks     Comment: occ    Drug use: No     Review of Systems  All systems were reviewed/examined and were negative except as noted in the HPI.    Physical Exam     Initial Vitals [06/25/23 2324]   BP Pulse Resp Temp SpO2   (!) 152/92 102 20 98.9 °F (37.2 °C) 96 %      MAP       --         Physical Exam    General: the patient is awake, alert, and in no apparent distress.  Head: normocephalic and atraumatic, sclera are clear  Neck: supple without meningismus  Chest: clear to auscultation bilaterally, no respiratory distress  Heart: regular rate and rhythm  ABD soft, nontender, nondistended, no peritoneal signs  Back nt in the midline  Extremities: warm and well perfused    No calf t no edema  Skin: warm and dry  Psych conversant  Neuro: awake, alert, moving all extremities    ED Course   Procedures  Labs Reviewed   CBC W/ AUTO DIFFERENTIAL - Abnormal; Notable for the following components:       Result Value    Hemoglobin 13.1 (*)     MCV 76 (*)     MCH 24.4 (*)     All other components within normal limits   COMPREHENSIVE METABOLIC PANEL - Abnormal; Notable for  the following components:    Glucose 130 (*)     All other components within normal limits   TSH - Abnormal; Notable for the following components:    TSH 4.170 (*)     All other components within normal limits   TROPONIN I   D DIMER, QUANTITATIVE   URINALYSIS, REFLEX TO URINE CULTURE    Narrative:     Preferred Collection Type->Urine, Clean Catch  Specimen Source->Urine   SARS-COV-2 RNA AMPLIFICATION, QUAL    Narrative:     Is the patient symptomatic?->Yes   T4, FREE        ECG Results              EKG 12-lead (Final result)  Result time 06/26/23 09:15:53      Final result by Interface, Lab In [facility] (06/26/23 09:15:53)                   Narrative:    Test Reason : R07.9,    Vent. Rate : 102 BPM     Atrial Rate : 102 BPM     P-R Int : 156 ms          QRS Dur : 074 ms      QT Int : 316 ms       P-R-T Axes : 064 076 -07 degrees     QTc Int : 411 ms    Sinus tachycardia with frequent Premature ventricular complexes  Abnormal R wave progression in the precordial leads  T wave abnormality, consider inferior ischemia  Abnormal ECG  When compared with ECG of 29-MAR-2020 07:54,  Premature ventricular complexes are now Present  Confirmed by Gustabo Donovan MD ([id number]) on 6/26/2023 9:15:38 AM    Referred By: [email or web address]   SELF           Confirmed By:Gustabo Donovan MD                                  Imaging Results              X-Ray Chest AP Portable (Final result)  Result time 06/25/23 23:59:39      Final result by Braulio Chairez MD (06/25/23 23:59:39)                   Impression:      No acute abnormality.      Electronically signed by: Braulio Chairez  Date:    06/25/2023  Time:    23:59               Narrative:    EXAMINATION:  XR CHEST AP PORTABLE    CLINICAL HISTORY:  Chest Pain;    TECHNIQUE:  Single frontal view of the chest was performed.    COMPARISON:  None    FINDINGS:  The lungs are clear, with normal appearance of pulmonary vasculature and no pleural effusion or pneumothorax.    The cardiac silhouette is normal  in size. The hilar and mediastinal contours are unremarkable.    Bones are intact.                                       Medications   albuterol sulfate nebulizer solution 2.5 mg (2.5 mg Nebulization Given 6/26/23 0103)   predniSONE tablet 60 mg (60 mg Oral Given 6/26/23 0043)         Medical Decision Making:    This is an emergent evaluation of a patient presenting to the ED.  Nursing notes were reviewed.  I personally reviewed, read, and interpreted the ECG and any monitoring strips.  ECG: sinus tachycardia Compared with prior if available.  Read and interpreted by me independently.      I reviewed radiology images personally along with interpretations.  Imaging reviewed by me (chest x-ray), personally and independently, and prelims if available.  No acute/emergent pathologic findings noted on radiologic studies ordered.    I personally reviewed and interpreted the laboratory results.  Ddimer neg  I decided to obtain and review old medical records, which showed: ED visits    Evaluation for Emergency Medical Condition  The patient received a medical screening exam and within a reasonable degree of clinical confidence an emergency medical condition has not been identified.  The patient is instructed on proper follow up and return precautions to the ED.      Gamal Bhardwaj MD, HEATHER                       Clinical Impression:   Final diagnoses:  [R07.9] Chest pain  [R06.02] SOB (shortness of breath) (Primary)  [R00.2] Palpitations        ED Disposition Condition    Discharge Stable          ED Prescriptions       Medication Sig Dispense Start Date End Date Auth. Provider    predniSONE (DELTASONE) 20 MG tablet Take 3 tablets (60 mg total) by mouth once daily. for 7 days 21 tablet 6/26/2023 7/3/2023 Robson Bhardwaj MD    albuterol (PROVENTIL/VENTOLIN HFA) 90 mcg/actuation inhaler Inhale 1-2 puffs into the lungs every 6 (six) hours as needed for Wheezing. Rescue 18 g 6/26/2023 6/25/2024 Robson Bhardwaj MD    benzonatate  (TESSALON) 200 MG capsule Take 1 capsule (200 mg total) by mouth 3 (three) times daily as needed for Cough. 20 capsule 6/26/2023 7/6/2023 Robson Bhardwaj MD          Follow-up Information       Follow up With Specialties Details Why Contact Info    [facility] Schedule an appointment as soon as possible for a visit   [address] [place] [id number]  [phone]            Discharged to home in stable condition, return to ED warnings given, follow up and patient care instructions given.      Gamal Bhardwaj MD, HEATHER, FACEP  Department of Emergency Medicine       Robson Bhardwaj MD  06/28/23 3743

## 2023-06-26 NOTE — ED NOTES
Pt placed on 2L NC for desat to 88% while resting. SPO2 improved to mid 90's. Provider made aware . Pt reports he continues to feel SOB, appears at ease w/ respirations regular/unlabored

## 2023-10-18 NOTE — CARE UPDATE
Patient is COVID-19+ Positive. Airborne and Droplet precautions.     Nhan Woods MD   [employer] [facility], PGY-2   
QTc 407  
QTc 429  
Statement Selected

## 2024-01-05 ENCOUNTER — HOSPITAL ENCOUNTER (EMERGENCY)
Facility: HOSPITAL | Age: 44
Discharge: HOME | End: 2024-01-05
Attending: FAMILY MEDICINE
Payer: MEDICAID

## 2024-01-05 VITALS
RESPIRATION RATE: 18 BRPM | HEART RATE: 102 BPM | SYSTOLIC BLOOD PRESSURE: 164 MMHG | BODY MASS INDEX: 40.63 KG/M2 | DIASTOLIC BLOOD PRESSURE: 90 MMHG | WEIGHT: 300 LBS | TEMPERATURE: 98 F | HEIGHT: 72 IN | OXYGEN SATURATION: 98 %

## 2024-01-05 DIAGNOSIS — U07.1 COVID-19: Primary | ICD-10-CM

## 2024-01-05 DIAGNOSIS — R05.9 COUGH: ICD-10-CM

## 2024-01-05 DIAGNOSIS — U07.1 COVID-19 VIRUS DETECTED: ICD-10-CM

## 2024-01-05 LAB
INFLUENZA A, MOLECULAR: NEGATIVE
INFLUENZA B, MOLECULAR: NEGATIVE
SARS-COV-2 RDRP RESP QL NAA+PROBE: POSITIVE
SPECIMEN SOURCE: NORMAL

## 2024-01-05 PROCEDURE — 25000003 PHARM REV CODE 250: Mod: ER | Performed by: FAMILY MEDICINE

## 2024-01-05 PROCEDURE — 87502 INFLUENZA DNA AMP PROBE: CPT | Mod: ER | Performed by: NURSE PRACTITIONER

## 2024-01-05 PROCEDURE — 99284 EMERGENCY DEPT VISIT MOD MDM: CPT | Mod: 25,ER

## 2024-01-05 PROCEDURE — U0002 COVID-19 LAB TEST NON-CDC: HCPCS | Mod: ER | Performed by: NURSE PRACTITIONER

## 2024-01-05 RX ORDER — BENZONATATE 200 MG/1
200 CAPSULE ORAL 3 TIMES DAILY PRN
Qty: 30 CAPSULE | Refills: 0 | Status: SHIPPED | OUTPATIENT
Start: 2024-01-05 | End: 2024-01-15

## 2024-01-05 RX ORDER — ACETAMINOPHEN 500 MG
1000 TABLET ORAL
Status: COMPLETED | OUTPATIENT
Start: 2024-01-05 | End: 2024-01-05

## 2024-01-05 RX ORDER — NIRMATRELVIR AND RITONAVIR 300-100 MG
KIT ORAL
Qty: 30 TABLET | Refills: 0 | Status: SHIPPED | OUTPATIENT
Start: 2024-01-05 | End: 2024-01-10

## 2024-01-05 RX ORDER — BENZONATATE 100 MG/1
200 CAPSULE ORAL
Status: COMPLETED | OUTPATIENT
Start: 2024-01-05 | End: 2024-01-05

## 2024-01-05 RX ORDER — IBUPROFEN 800 MG/1
800 TABLET ORAL EVERY 6 HOURS PRN
Qty: 20 TABLET | Refills: 0 | Status: SHIPPED | OUTPATIENT
Start: 2024-01-05

## 2024-01-05 RX ORDER — IBUPROFEN 400 MG/1
800 TABLET ORAL
Status: COMPLETED | OUTPATIENT
Start: 2024-01-05 | End: 2024-01-05

## 2024-01-05 RX ADMIN — IBUPROFEN 800 MG: 400 TABLET ORAL at 07:01

## 2024-01-05 RX ADMIN — ACETAMINOPHEN 1000 MG: 500 TABLET ORAL at 07:01

## 2024-01-05 RX ADMIN — BENZONATATE 200 MG: 100 CAPSULE ORAL at 07:01

## 2024-01-06 NOTE — FIRST PROVIDER EVALUATION
Emergency Department TeleTriage Encounter Note      CHIEF COMPLAINT    Chief Complaint   Patient presents with    Cough     Pt c/o intermitt cough x2 days, productive with green phlegm, pt reports left side pain when coughing.        VITAL SIGNS   Initial Vitals [01/05/24 1801]   BP Pulse Resp Temp SpO2   (!) 164/90 102 18 98.4 °F (36.9 °C) 98 %      MAP       --            ALLERGIES    Review of patient's allergies indicates:   Allergen Reactions    Iodine and iodide containing products Anaphylaxis       PROVIDER TRIAGE NOTE  TeleTriage Note: Maxine Lemus, a nontoxic/well appearing, 43 y.o. male, presented to the ED with c/o coughing and pain to his ribs from coughing for the past 2 days. Denies fevers. Denies chest pain. SOB with laughing or walking.     All ED beds are full at present; patient notified of this status.  Patient seen and medically screened by Nurse Practitioner via teletriage. Orders initiated at triage to expedite care.  Patient is stable to return to the waiting room and will be placed in an ED bed when available.  Care will be transferred to an alternate provider when patient has been placed in an Exam Room from the [facility] for physical exam, additional orders, and disposition.  6:07 PM Ilene Arrieta DNP, FNP-C        ORDERS  Labs Reviewed   INFLUENZA A & B BY MOLECULAR   SARS-COV-2 RNA AMPLIFICATION, QUAL       ED Orders (720h ago, onward)      Start Ordered     Status Ordering Provider    01/05/24 1809 01/05/24 1808  X-Ray Chest PA And Lateral  1 time imaging         Ordered ILENE ARRIETA    01/05/24 1809 01/05/24 1808  COVID-19 Rapid Screening  STAT         Ordered ILENE ARRIETA    01/05/24 1809 01/05/24 1808  Influenza A & B by Molecular  STAT         Ordered ILENE ARRIETA              Virtual Visit Note: The provider triage portion of this emergency department evaluation and documentation was performed via BEKIZ, a HIPAA-compliant telemedicine application, in  concert with a tele-presenter in the room. A face to face patient evaluation with one of my colleagues will occur once the patient is placed in an emergency department room.      DISCLAIMER: This note was prepared with [employer] voice recognition transcription software. Garbled syntax, mangled pronouns, and other bizarre constructions may be attributed to that software system.

## 2024-01-06 NOTE — ED PROVIDER NOTES
Encounter Date: 1/5/2024       History     Chief Complaint   Patient presents with    Cough     Pt c/o intermitt cough x2 days, productive with green phlegm, pt reports left side pain when coughing.      43-year-old male complains of cough and congestion for last 2 days.  Coughing caused pain to his bilateral rib area.    The history is provided by the patient.     Review of patient's allergies indicates:   Allergen Reactions    Iodine and iodide containing products Anaphylaxis     Past Medical History:   Diagnosis Date    Hypertension     Metatarsal fracture     10/14; left     No past surgical history on file.  Family History   Problem Relation Age of Onset    Diabetes Mother     Cancer Mother     No Known Problems Father      Social History     Tobacco Use    Smoking status: Never    Smokeless tobacco: Never   Substance Use Topics    Alcohol use: Yes     Alcohol/week: 0.0 standard drinks of alcohol     Comment: occ    Drug use: No     Review of Systems   Constitutional:  Positive for fever. Negative for activity change, appetite change, chills and diaphoresis.   HENT:  Positive for congestion, rhinorrhea and sore throat. Negative for ear discharge, ear pain and postnasal drip.    Eyes:  Negative for photophobia, pain, redness and visual disturbance.   Respiratory:  Positive for cough. Negative for chest tightness, shortness of breath and wheezing.    Cardiovascular:  Negative for chest pain, palpitations and leg swelling.   Gastrointestinal:  Negative for abdominal distention, abdominal pain, diarrhea, nausea and vomiting.   Genitourinary:  Negative for dysuria and frequency.   Musculoskeletal:  Negative for back pain, gait problem, neck pain and neck stiffness.   Skin:  Negative for rash.   Neurological:  Negative for dizziness, weakness, light-headedness and headaches.   Psychiatric/Behavioral:  Negative for confusion, dysphoric mood and hallucinations. The patient is not nervous/anxious.    All other systems  reviewed and are negative.      Physical Exam     Initial Vitals [01/05/24 1801]   BP Pulse Resp Temp SpO2   (!) 164/90 102 18 98.4 °F (36.9 °C) 98 %      MAP       --         Physical Exam    Nursing note and vitals reviewed.  Constitutional: Vital signs are normal. He appears well-developed and well-nourished. He is active. No distress.   HENT:   Head: Normocephalic.   Nose: Nose normal.   Mouth/Throat: Oropharynx is clear and moist and mucous membranes are normal.   Eyes: Conjunctivae, EOM and lids are normal.   Neck: Neck supple.   Normal range of motion.  Cardiovascular:  Normal rate, regular rhythm, S1 normal, S2 normal and normal heart sounds.           Pulmonary/Chest: Breath sounds normal. No respiratory distress. He has no wheezes. He has no rhonchi. He has no rales.   Abdominal: Abdomen is soft. Bowel sounds are normal. He exhibits no distension. There is no abdominal tenderness. There is no rebound and no guarding.   Musculoskeletal:         General: Normal range of motion.      Right upper arm: Normal.      Left upper arm: Normal.      Cervical back: Normal range of motion and neck supple.      Right lower leg: Normal.      Left lower leg: Normal.     Neurological: He is alert and oriented to person, place, and time. He has normal strength. GCS score is 15. GCS eye subscore is 4. GCS verbal subscore is 5. GCS motor subscore is 6.   Skin: Skin is warm. Capillary refill takes less than 2 seconds.   Psychiatric: He has a normal mood and affect. His speech is normal and behavior is normal. Thought content normal. Cognition and memory are normal.         ED Course   Procedures  Labs Reviewed   SARS-COV-2 RNA AMPLIFICATION, QUAL - Abnormal; Notable for the following components:       Result Value    SARS-CoV-2 RNA, Amplification, Qual Positive (*)     All other components within normal limits    Narrative:     Is the patient symptomatic?->Yes   INFLUENZA A & B BY MOLECULAR          Imaging Results               X-Ray Chest PA And Lateral (Final result)  Result time 01/05/24 19:16:47      Final result by Rolando Martinez MD (01/05/24 19:16:47)                   Impression:      No acute finding in the chest.      Electronically signed by: Rolando Martinez  Date:    01/05/2024  Time:    19:16               Narrative:    EXAMINATION:  XR CHEST PA AND LATERAL    CLINICAL HISTORY:  Cough, unspecified    TECHNIQUE:  PA and lateral views of the chest were performed.    FINDINGS:  Comparison: 06/25/2023.    Mediastinal silhouette is within normal limits.  The lungs are clear.  No pneumothorax or pleural effusion.  No acute osseous finding.                                       Medications   acetaminophen tablet 1,000 mg (has no administration in time range)   ibuprofen tablet 800 mg (has no administration in time range)   benzonatate capsule 200 mg (has no administration in time range)     Medical Decision Making  Differential diagnosis include and not limited to upper respiratory tract infection, pneumonia, bronchitis, influenza/COVID,    Patient is positive for COVID.  X-ray chest no pneumonia.  Prescription for Paxlovid, Tessalon.  Advised to take adequate Tylenol, Motrin as needed for pain.  Adequate hydration and nutrition.  Follow-up ED with any worsening symptoms.    Amount and/or Complexity of Data Reviewed  Labs: ordered. Decision-making details documented in ED Course.     Details: COVID positive.  Radiology: ordered and independent interpretation performed. Decision-making details documented in ED Course.     Details: X-ray of chest no pneumonia.                                      Clinical Impression:  Final diagnoses:  [R05.9] Cough  [U07.1] COVID-19 (Primary)          ED Disposition Condition    Discharge Stable          ED Prescriptions       Medication Sig Dispense Start Date End Date Auth. Provider    benzonatate (TESSALON) 200 MG capsule Take 1 capsule (200 mg total) by mouth 3 (three) times daily as needed.  30 capsule 1/5/2024 1/15/2024 Reji Earl MD    nirmatrelvir-ritonavir (PAXLOVID) 300 mg (150 mg x 2)-100 mg copackaged tablets (EUA) Take 3 tablets by mouth 2 (two) times daily. Each dose contains 2 nirmatrelvir (pink tablets) and 1 ritonavir (white tablet). Take all 3 tablets together 30 tablet 1/5/2024 1/10/2024 Reji Earl MD    ibuprofen (ADVIL,MOTRIN) 800 MG tablet Take 1 tablet (800 mg total) by mouth every 6 (six) hours as needed for Pain. 20 tablet 1/5/2024 -- Reji Earl MD          Follow-up Information       Follow up With Specialties Details Why Contact Af[facility] Ford FNP Family Medicine Schedule an appointment as soon as possible for a visit in 1 week If symptoms worsen [address] [place] [address] AT [facility] [address]  [phone]      [facility] - Emergency Dept Emergency Medicine  If symptoms worsen [address] [facility] [id number] [phone]             Reji Earl MD  01/05/24 1549

## 2024-03-18 ENCOUNTER — HOSPITAL ENCOUNTER (EMERGENCY)
Facility: HOSPITAL | Age: 44
Discharge: HOME | End: 2024-03-18
Attending: EMERGENCY MEDICINE
Payer: MEDICAID

## 2024-03-18 VITALS
DIASTOLIC BLOOD PRESSURE: 72 MMHG | WEIGHT: 315 LBS | HEART RATE: 74 BPM | TEMPERATURE: 98 F | OXYGEN SATURATION: 99 % | HEIGHT: 73 IN | SYSTOLIC BLOOD PRESSURE: 138 MMHG | RESPIRATION RATE: 17 BRPM | BODY MASS INDEX: 41.75 KG/M2

## 2024-03-18 DIAGNOSIS — R07.81 RIB PAIN ON RIGHT SIDE: ICD-10-CM

## 2024-03-18 DIAGNOSIS — S29.011A MUSCLE STRAIN OF CHEST WALL, INITIAL ENCOUNTER: Primary | ICD-10-CM

## 2024-03-18 DIAGNOSIS — R10.9 RIGHT FLANK PAIN: ICD-10-CM

## 2024-03-18 LAB
ALBUMIN SERPL BCP-MCNC: 3.9 G/DL (ref 3.5–5.2)
ALP SERPL-CCNC: 78 U/L (ref 38–126)
ALT SERPL W/O P-5'-P-CCNC: 36 U/L (ref 10–44)
ANION GAP SERPL CALC-SCNC: 9 MMOL/L (ref 8–16)
AST SERPL-CCNC: 27 U/L (ref 15–46)
BASOPHILS # BLD AUTO: 0.04 K/UL (ref 0–0.2)
BASOPHILS NFR BLD: 0.5 % (ref 0–1.9)
BILIRUB SERPL-MCNC: 0.3 MG/DL (ref 0.1–1)
BILIRUB UR QL STRIP: NEGATIVE
CALCIUM SERPL-MCNC: 9.2 MG/DL (ref 8.7–10.5)
CHLORIDE SERPL-SCNC: 111 MMOL/L (ref 95–110)
CLARITY UR REFRACT.AUTO: CLEAR
CO2 SERPL-SCNC: 21 MMOL/L (ref 23–29)
COLOR UR AUTO: YELLOW
CREAT SERPL-MCNC: 0.76 MG/DL (ref 0.5–1.4)
DIFFERENTIAL METHOD BLD: ABNORMAL
EOSINOPHIL # BLD AUTO: 0.3 K/UL (ref 0–0.5)
EOSINOPHIL NFR BLD: 3.9 % (ref 0–8)
ERYTHROCYTE [DISTWIDTH] IN BLOOD BY AUTOMATED COUNT: 14.5 % (ref 11.5–14.5)
EST. GFR  (NO RACE VARIABLE): >60 ML/MIN/1.73 M^2
GLUCOSE SERPL-MCNC: 114 MG/DL (ref 70–110)
GLUCOSE UR QL STRIP: NEGATIVE
HCT VFR BLD AUTO: 39.6 % (ref 40–54)
HGB BLD-MCNC: 12.6 G/DL (ref 14–18)
HGB UR QL STRIP: NEGATIVE
IMM GRANULOCYTES # BLD AUTO: 0.04 K/UL (ref 0–0.04)
IMM GRANULOCYTES NFR BLD AUTO: 0.5 % (ref 0–0.5)
KETONES UR QL STRIP: NEGATIVE
LEUKOCYTE ESTERASE UR QL STRIP: NEGATIVE
LIPASE SERPL-CCNC: 143 U/L (ref 23–300)
LYMPHOCYTES # BLD AUTO: 2.3 K/UL (ref 1–4.8)
LYMPHOCYTES NFR BLD: 31.2 % (ref 18–48)
MCH RBC QN AUTO: 24 PG (ref 27–31)
MCHC RBC AUTO-ENTMCNC: 31.8 G/DL (ref 32–36)
MCV RBC AUTO: 75 FL (ref 82–98)
MONOCYTES # BLD AUTO: 0.9 K/UL (ref 0.3–1)
MONOCYTES NFR BLD: 11.7 % (ref 4–15)
NEUTROPHILS # BLD AUTO: 3.8 K/UL (ref 1.8–7.7)
NEUTROPHILS NFR BLD: 52.2 % (ref 38–73)
NITRITE UR QL STRIP: NEGATIVE
NRBC BLD-RTO: 0 /100 WBC
PH UR STRIP: 7 [PH] (ref 5–8)
PLATELET # BLD AUTO: 265 K/UL (ref 150–450)
PMV BLD AUTO: 11.7 FL (ref 9.2–12.9)
POTASSIUM SERPL-SCNC: 3.8 MMOL/L (ref 3.5–5.1)
PROT SERPL-MCNC: 7.5 G/DL (ref 6–8.4)
PROT UR QL STRIP: NEGATIVE
RBC # BLD AUTO: 5.25 M/UL (ref 4.6–6.2)
SODIUM SERPL-SCNC: 141 MMOL/L (ref 136–145)
SP GR UR STRIP: 1.02 (ref 1–1.03)
TROPONIN I SERPL-MCNC: <0.012 NG/ML (ref 0.01–0.03)
URN SPEC COLLECT METH UR: NORMAL
UROBILINOGEN UR STRIP-ACNC: NEGATIVE EU/DL
UUN UR-MCNC: 13 MG/DL (ref 2–20)
WBC # BLD AUTO: 7.35 K/UL (ref 3.9–12.7)

## 2024-03-18 PROCEDURE — 81003 URINALYSIS AUTO W/O SCOPE: CPT | Mod: ER | Performed by: EMERGENCY MEDICINE

## 2024-03-18 PROCEDURE — 99285 EMERGENCY DEPT VISIT HI MDM: CPT | Mod: 25,ER

## 2024-03-18 PROCEDURE — 96374 THER/PROPH/DIAG INJ IV PUSH: CPT | Mod: ER

## 2024-03-18 PROCEDURE — 63600175 PHARM REV CODE 636 W HCPCS: Mod: ER | Performed by: EMERGENCY MEDICINE

## 2024-03-18 PROCEDURE — 83690 ASSAY OF LIPASE: CPT | Mod: ER | Performed by: EMERGENCY MEDICINE

## 2024-03-18 PROCEDURE — 85025 COMPLETE CBC W/AUTO DIFF WBC: CPT | Mod: ER | Performed by: EMERGENCY MEDICINE

## 2024-03-18 PROCEDURE — 80053 COMPREHEN METABOLIC PANEL: CPT | Mod: ER | Performed by: EMERGENCY MEDICINE

## 2024-03-18 PROCEDURE — 84484 ASSAY OF TROPONIN QUANT: CPT | Mod: ER | Performed by: FAMILY MEDICINE

## 2024-03-18 RX ORDER — KETOROLAC TROMETHAMINE 30 MG/ML
10 INJECTION, SOLUTION INTRAMUSCULAR; INTRAVENOUS
Status: COMPLETED | OUTPATIENT
Start: 2024-03-18 | End: 2024-03-18

## 2024-03-18 RX ORDER — LIDOCAINE 50 MG/G
1 PATCH TOPICAL DAILY
Qty: 10 PATCH | Refills: 0 | Status: SHIPPED | OUTPATIENT
Start: 2024-03-18

## 2024-03-18 RX ORDER — ATORVASTATIN CALCIUM 10 MG/1
10 TABLET, FILM COATED ORAL
COMMUNITY
Start: 2024-03-14

## 2024-03-18 RX ORDER — METHOCARBAMOL 500 MG/1
500 TABLET, FILM COATED ORAL 3 TIMES DAILY
Qty: 30 TABLET | Refills: 0 | Status: SHIPPED | OUTPATIENT
Start: 2024-03-18 | End: 2024-03-28

## 2024-03-18 RX ORDER — DICLOFENAC SODIUM 75 MG/1
75 TABLET, DELAYED RELEASE ORAL 2 TIMES DAILY
Qty: 20 TABLET | Refills: 0 | Status: SHIPPED | OUTPATIENT
Start: 2024-03-18

## 2024-03-18 RX ORDER — SEMAGLUTIDE 0.68 MG/ML
0.5 INJECTION, SOLUTION SUBCUTANEOUS
COMMUNITY
Start: 2024-03-01

## 2024-03-18 RX ORDER — TRIAMTERENE/HYDROCHLOROTHIAZID 37.5-25 MG
1 TABLET ORAL EVERY MORNING
COMMUNITY
Start: 2024-02-21

## 2024-03-18 RX ORDER — CETIRIZINE HYDROCHLORIDE 10 MG/1
10 TABLET ORAL
COMMUNITY
Start: 2024-03-14

## 2024-03-18 RX ORDER — VALSARTAN 80 MG/1
80 TABLET ORAL
COMMUNITY
Start: 2024-03-14

## 2024-03-18 RX ADMIN — KETOROLAC TROMETHAMINE 10 MG: 30 INJECTION, SOLUTION INTRAMUSCULAR; INTRAVENOUS at 05:03

## 2024-03-19 NOTE — ED PROVIDER NOTES
ED Provider Note - 3/18/2024    History     Chief Complaint   Patient presents with    Chest Pain     Pt to the Er with right lower rib pain progressing for past 2 months. Seen by PCP on Thursday and changed BP meds and given pain medication. Pt reports medication not helping.      Patient currently presents with concern regarding right lower chest wall pain.  Patient notes that this has been ongoing for a couple of weeks now.  Patient does seem to think that the pain worsens after eating.  He denies any associated abdominal pain.  There has not been fever or chills.  Patient does not have any shortness of breath.  Patient is unaware of any inciting traumatic event that may have caused these symptoms.      Review of patient's allergies indicates:   Allergen Reactions    Iodine and iodide containing products Anaphylaxis     Past Medical History:   Diagnosis Date    Hypertension     Metatarsal fracture     10/14; left     History reviewed. No pertinent surgical history.  Family History   Problem Relation Age of Onset    Diabetes Mother     Cancer Mother     No Known Problems Father      Social History     Tobacco Use    Smoking status: Never    Smokeless tobacco: Never   Substance Use Topics    Alcohol use: Yes     Alcohol/week: 0.0 standard drinks of alcohol     Comment: occ    Drug use: No     Review of Systems   Constitutional:  Negative for chills and fever.   HENT:  Negative for congestion and sore throat.    Respiratory:  Negative for cough and shortness of breath.    Cardiovascular:  Negative for palpitations and leg swelling.   Gastrointestinal:  Negative for abdominal pain and vomiting.   Genitourinary:  Negative for difficulty urinating and dysuria.   Skin:  Negative for color change and rash.   Neurological:  Negative for weakness and numbness.   All other systems reviewed and are negative.      Physical Exam     Initial Vitals [03/18/24 0446]   BP Pulse Resp Temp SpO2   (!) 143/88 (!) 57 20 97.7 °F (36.5  "°C) 97 %      MAP       --         Vitals:    03/18/24 0446 03/18/24 0712 03/18/24 0920   BP: (!) 143/88 (!) 158/76 138/72   Pulse: (!) 57 68 74   Resp: 20 19 17   Temp: 97.7 °F (36.5 °C)     TempSrc: Oral     SpO2: 97% 99% 99%   Weight: (!) 149.7 kg (330 lb)     Height: 6' 1" (1.854 m)       Physical Exam    Nursing note and vitals reviewed.  Constitutional: He appears well-developed and well-nourished. He is not diaphoretic. No distress.   HENT:   Head: Normocephalic and atraumatic.   Nose: Nose normal.   Mouth/Throat: Oropharynx is clear and moist.   Eyes: Conjunctivae are normal. No scleral icterus.   Cardiovascular:  Normal rate, regular rhythm, normal heart sounds and intact distal pulses.           Pulmonary/Chest: Breath sounds normal. No respiratory distress. He exhibits tenderness (right lower lateral ribcage).   Abdominal: Abdomen is soft. He exhibits no distension. There is no abdominal tenderness.   Musculoskeletal:         General: No edema. Normal range of motion.     Neurological: He is alert and oriented to person, place, and time. He has normal strength.   Skin: Skin is warm and dry.       ED Course   Procedures                   MDM  Differential Diagnoses   Based on available history, the working differential diagnoses considered during this evaluation include but are not limited to acute coronary syndrome, pulmonary embolus, esophageal perforation, aortic dissection, pneumonia, and pneumothorax as well as musculoskeletal sources including chest wall strain and costochondritis and GI sources such as biliary colic/cholecystitis, esophageal spasm and GERD..      LABS     Labs Reviewed   CBC W/ AUTO DIFFERENTIAL - Abnormal; Notable for the following components:       Result Value    Hemoglobin 12.6 (*)     Hematocrit 39.6 (*)     MCV 75 (*)     MCH 24.0 (*)     MCHC 31.8 (*)     All other components within normal limits   COMPREHENSIVE METABOLIC PANEL - Abnormal; Notable for the following " components:    Chloride 111 (*)     CO2 21 (*)     Glucose 114 (*)     All other components within normal limits   LIPASE   URINALYSIS, REFLEX TO URINE CULTURE    Narrative:     Preferred Collection Type->Urine, Clean Catch  Specimen Source->Urine   TROPONIN I           All available results from the labs ordered were independently reviewed. with findings as follows:  CBC unremarkable.  CMP unremarkable.  Lipase, troponin, and urinalysis within normal limits.     Imaging     Imaging Results              X-Ray Ribs 2 View Right (Final result)  Result time 03/18/24 09:20:47      Final result by Carlos Marques MD (03/18/24 09:20:47)                   Impression:      Normal study.      Electronically signed by: Carlos Marques MD  Date:    03/18/2024  Time:    09:20               Narrative:    EXAMINATION:  XR RIBS 2 VIEW RIGHT    CLINICAL HISTORY:  - Pleurodynia.    TECHNIQUE:  Right chest, 4 views    COMPARISON:  None    FINDINGS:  No osseous, articular, or soft tissue abnormality.                                       US Abdomen Limited (Final result)  Result time 03/18/24 08:14:40      Final result by Compa Jade III, MD (03/18/24 08:14:40)                   Impression:      Enlarged, fatty liver.  No other significant sonographic abnormality.      Electronically signed by: Compa Jade MD  Date:    03/18/2024  Time:    08:14               Narrative:    EXAMINATION:  US ABDOMEN LIMITED    CLINICAL HISTORY:  RUQ PAIN;    TECHNIQUE:  Limited ultrasound of the right upper quadrant of the abdomen (including pancreas, liver, gallbladder, common bile duct, and spleen) was performed.    FINDINGS:  Liver: Enlarged measuring 19.4 cm. Diffuse fatty infiltration.  No focal hepatic lesions.    Gallbladder: No calculi, wall thickening, or pericholecystic fluid.    Biliary system: The common duct is not dilated, measuring 2.4 mm.  No intrahepatic ductal dilatation.    Right kidney: Measures 12 cm.  No suspicious mass,  hydronephrosis or nephrolithiasis identified.    Pancreas: Visualized segments are unremarkable.    Vasculature: Normal caliber aorta and IVC. Appropriate hepatopedal flow is seen in the main portal vein.    Miscellaneous: No upper abdominal ascites.                                       X-Ray Chest PA And Lateral (Final result)  Result time 03/18/24 07:31:43      Final result by Jj Delgadillo MD (03/18/24 07:31:43)                   Impression:      No acute process seen.      Electronically signed by: Jj Delgadillo MD  Date:    03/18/2024  Time:    07:31               Narrative:    EXAMINATION:  XR CHEST PA AND LATERAL    CLINICAL HISTORY:  Unspecified abdominal pain    COMPARISON:  01/05/2024    FINDINGS:  Cardiac silhouette is normal.  The lungs demonstrate no evidence of active disease.  No evidence of pleural effusion or pneumothorax.  Bones appear intact.                                       X-Rays:   Independently Interpreted Readings:   Chest X-Ray: Normal heart size.  No infiltrates.  No acute abnormalities.     Images from the rib series and ultrasound of the right upper quadrant were pending at the time of transition.     EKG    Pending at the time of transition.    ED Management/Discussion     Medications   ketorolac injection 9.999 mg (9.999 mg Intravenous Given 3/18/24 0511)                 The patient's list of active medical problems, social history, medications, and allergies as documented per RN staff has been reviewed.           Following discussion of all pertinent details presently available from the patient's encounter, the patient was transitioned into the care of  for ongoing evaluation and management.    Referrals:  No orders of the defined types were placed in this encounter.      CLINICAL IMPRESSION    ICD-10-CM ICD-9-CM   1. Muscle strain of chest wall, initial encounter  S29.011A 848.8   2. Right flank pain  R10.9 789.09   3. Rib pain on right side  R07.81 786.50           ED Disposition Condition    Discharge Stable                 Hai Bermudez MD  03/19/24 4861

## 2024-11-12 ENCOUNTER — HOSPITAL ENCOUNTER (EMERGENCY)
Facility: HOSPITAL | Age: 44
Discharge: HOME | End: 2024-11-12
Attending: EMERGENCY MEDICINE
Payer: MEDICAID

## 2024-11-12 VITALS
BODY MASS INDEX: 40.63 KG/M2 | OXYGEN SATURATION: 100 % | HEART RATE: 68 BPM | TEMPERATURE: 98 F | SYSTOLIC BLOOD PRESSURE: 150 MMHG | RESPIRATION RATE: 20 BRPM | DIASTOLIC BLOOD PRESSURE: 80 MMHG | WEIGHT: 300 LBS | HEIGHT: 72 IN

## 2024-11-12 DIAGNOSIS — R20.0 LEFT LEG NUMBNESS: Primary | ICD-10-CM

## 2024-11-12 DIAGNOSIS — R20.0 LEFT ARM NUMBNESS: ICD-10-CM

## 2024-11-12 PROCEDURE — 93010 ELECTROCARDIOGRAM REPORT: CPT | Mod: ,,, | Performed by: INTERNAL MEDICINE

## 2024-11-12 PROCEDURE — 99283 EMERGENCY DEPT VISIT LOW MDM: CPT | Mod: 25,ER

## 2024-11-12 PROCEDURE — 99900035 HC TECH TIME PER 15 MIN (STAT): Mod: ER

## 2024-11-12 PROCEDURE — 93005 ELECTROCARDIOGRAM TRACING: CPT | Mod: ER

## 2024-11-12 NOTE — DISCHARGE INSTRUCTIONS
Do not put pressure on left arm when driving or when lying down.    Do not put pressure on left leg by sitting too long on the toilet.  Do not use the phone while on the toilet.  Follow up with primary care.

## 2024-11-12 NOTE — ED PROVIDER NOTES
Encounter Date: 11/12/2024       History     Chief Complaint   Patient presents with    Numbness     Numbness to left arm and left leg off and on for a month     44-year-old patient presents to the emergency department with numbness of the left arm.  Patient states that this happens drives and rests his arm on his door.  Patient states that sometimes his left arm goes numb while he is lying in bed on his left side.  Patient has not had any injury to the elbow shoulder or spine.  Patient denied any pain.  Patient also complains of numbness behind the left leg.  Patient states that he usually notices this when he is in the toilet.  Patient states that he does spend a significant amount of time on the toilet sometimes especially if he is on the phone.  Patient has had no injury of the hip spine or the leg.    The history is provided by the patient.     Review of patient's allergies indicates:   Allergen Reactions    Iodine and iodide containing products Anaphylaxis     Past Medical History:   Diagnosis Date    Hypertension     Metatarsal fracture     10/14; left     History reviewed. No pertinent surgical history.  Family History   Problem Relation Name Age of Onset    Diabetes Mother      Cancer Mother      No Known Problems Father       Social History     Tobacco Use    Smoking status: Never    Smokeless tobacco: Never   Substance Use Topics    Alcohol use: Yes     Alcohol/week: 0.0 standard drinks of alcohol     Comment: occ    Drug use: No     Review of Systems   Constitutional: Negative.    HENT: Negative.     Eyes: Negative.    Respiratory: Negative.     Cardiovascular: Negative.    Gastrointestinal: Negative.    Endocrine: Negative.    Genitourinary: Negative.    Musculoskeletal: Negative.    Skin: Negative.    Allergic/Immunologic: Negative.    Neurological:  Positive for numbness.   Hematological: Negative.    Psychiatric/Behavioral: Negative.         Physical Exam     Initial Vitals [11/12/24 1318]   BP Pulse  Resp Temp SpO2   (!) 174/118 68 20 97.9 °F (36.6 °C) 100 %      MAP       --         Physical Exam    Constitutional: He appears well-developed and well-nourished.   HENT:   Head: Normocephalic.   Right Ear: Hearing normal.   Left Ear: Hearing normal.   Nose: Nose normal.   Cardiovascular:  Normal rate and regular rhythm.           Pulmonary/Chest: Effort normal and breath sounds normal.           ED Course   Procedures  Labs Reviewed - No data to display  EKG Readings: (Independently Interpreted)   Initial Reading: No STEMI.     ECG Results              EKG 12-lead (In process)        Collection Time Result Time QRS Duration OHS QTC Calculation    11/12/24 14:13:41 11/12/24 14:20:54 80 382                     In process by Interface, Lab In [facility] (11/12/24 14:20:58)                   Narrative:    Test Reason : R20.0,    Vent. Rate : 060 BPM     Atrial Rate : 060 BPM     P-R Int : 176 ms          QRS Dur : 080 ms      QT Int : 382 ms       P-R-T Axes : 029 073 -15 degrees     QTc Int : 382 ms    Normal sinus rhythm  Abnormal QRS-T angle, consider primary T wave abnormality  Abnormal ECG  When compared with ECG of 25-JUN-2023 23:26,  Premature ventricular complexes are no longer Present  Vent. rate has decreased BY  42 BPM    Referred By: [email or web address]   SELF           Confirmed By:                                   Imaging Results    None          Medications - No data to display  Medical Decision Making  44 year old patient presents to the emergency department with numbness of the left arm in the left leg.  Patient states that this happened in the left arm after driving and happened in the leg after sitting.  Patient has not had any injuries.  EKG did not show any ST elevation or J-point changes.                                      Clinical Impression:  Final diagnoses:  [R20.0] Left arm numbness  [R20.0] Left leg numbness (Primary)          ED Disposition Condition    Discharge Stable          ED Prescriptions     None       Follow-up Information       Follow up With Specialties Details Why Contact Info    Afshan Clifton, [facility] Go to  As needed [address] [facility] [place] [address]  [phone]               Marqiuse Mandujano, PABobbyC  11/12/24 9980

## 2024-11-13 LAB
OHS QRS DURATION: 80 MS
OHS QTC CALCULATION: 382 MS

## 2024-11-18 ENCOUNTER — HOSPITAL ENCOUNTER (OUTPATIENT)
Dept: RADIOLOGY | Facility: HOSPITAL | Age: 44
Discharge: HOME | End: 2024-11-18
Attending: NURSE PRACTITIONER
Payer: MEDICAID

## 2024-11-18 DIAGNOSIS — M54.50 LUMBAR PAIN: ICD-10-CM

## 2024-11-18 DIAGNOSIS — M25.512 LEFT SHOULDER PAIN: ICD-10-CM

## 2024-11-18 DIAGNOSIS — M25.512 LEFT SHOULDER PAIN: Primary | ICD-10-CM

## 2024-11-21 ENCOUNTER — HOSPITAL ENCOUNTER (OUTPATIENT)
Dept: RADIOLOGY | Facility: HOSPITAL | Age: 44
Discharge: HOME | End: 2024-11-21
Attending: NURSE PRACTITIONER
Payer: MEDICAID

## 2024-11-21 DIAGNOSIS — M54.50 LUMBAR PAIN: ICD-10-CM

## 2024-11-21 PROCEDURE — 72110 X-RAY EXAM L-2 SPINE 4/>VWS: CPT | Mod: TC,FY,PN

## 2024-11-21 PROCEDURE — 73030 X-RAY EXAM OF SHOULDER: CPT | Mod: 26,LT,, | Performed by: RADIOLOGY

## 2024-11-21 PROCEDURE — 73030 X-RAY EXAM OF SHOULDER: CPT | Mod: TC,FY,PN,LT

## 2024-11-21 PROCEDURE — 72110 X-RAY EXAM L-2 SPINE 4/>VWS: CPT | Mod: 26,,, | Performed by: RADIOLOGY
